# Patient Record
Sex: FEMALE | Race: WHITE | NOT HISPANIC OR LATINO | Employment: FULL TIME | URBAN - METROPOLITAN AREA
[De-identification: names, ages, dates, MRNs, and addresses within clinical notes are randomized per-mention and may not be internally consistent; named-entity substitution may affect disease eponyms.]

---

## 2017-04-12 ENCOUNTER — TRANSCRIBE ORDERS (OUTPATIENT)
Dept: OBSTETRICS AND GYNECOLOGY | Facility: CLINIC | Age: 47
End: 2017-04-12

## 2017-04-12 DIAGNOSIS — Z12.31 VISIT FOR SCREENING MAMMOGRAM: Primary | ICD-10-CM

## 2017-05-08 ENCOUNTER — HOSPITAL ENCOUNTER (OUTPATIENT)
Dept: MAMMOGRAPHY | Facility: HOSPITAL | Age: 47
Discharge: HOME | End: 2017-05-08
Attending: OBSTETRICS & GYNECOLOGY | Admitting: OBSTETRICS & GYNECOLOGY

## 2017-05-08 DIAGNOSIS — Z12.31 VISIT FOR SCREENING MAMMOGRAM: ICD-10-CM

## 2017-05-08 PROCEDURE — 77063 BREAST TOMOSYNTHESIS BI: CPT

## 2017-05-08 PROCEDURE — G0202 SCR MAMMO BI INCL CAD: HCPCS

## 2017-05-08 PROCEDURE — 77067 SCR MAMMO BI INCL CAD: CPT | Performed by: RADIOLOGY

## 2017-05-08 PROCEDURE — 77063 BREAST TOMOSYNTHESIS BI: CPT | Performed by: RADIOLOGY

## 2017-06-01 ENCOUNTER — OFFICE VISIT (OUTPATIENT)
Dept: OBSTETRICS AND GYNECOLOGY | Facility: CLINIC | Age: 47
End: 2017-06-01

## 2017-06-01 VITALS
WEIGHT: 175 LBS | BODY MASS INDEX: 25.05 KG/M2 | HEIGHT: 70 IN | DIASTOLIC BLOOD PRESSURE: 70 MMHG | SYSTOLIC BLOOD PRESSURE: 110 MMHG

## 2017-06-01 DIAGNOSIS — Z30.431 IUD CHECK UP: ICD-10-CM

## 2017-06-01 DIAGNOSIS — Z01.419 WOMEN'S ANNUAL ROUTINE GYNECOLOGICAL EXAMINATION: Primary | ICD-10-CM

## 2017-06-01 PROBLEM — Z90.49 S/P CHOLECYSTECTOMY: Status: ACTIVE | Noted: 2017-06-01

## 2017-06-01 PROBLEM — F17.210 SMOKES 1 TO 10 CIGARETTES PER DAY: Status: ACTIVE | Noted: 2017-06-01

## 2017-06-01 PROBLEM — F41.9 ANXIETY AND DEPRESSION: Status: ACTIVE | Noted: 2017-06-01

## 2017-06-01 PROBLEM — F32.A ANXIETY AND DEPRESSION: Status: ACTIVE | Noted: 2017-06-01

## 2017-06-01 PROBLEM — Z98.82 STATUS POST BREAST AUGMENTATION: Status: ACTIVE | Noted: 2017-06-01

## 2017-06-01 PROBLEM — Z97.5 IUD (INTRAUTERINE DEVICE) IN PLACE: Status: ACTIVE | Noted: 2017-06-01

## 2017-06-01 PROCEDURE — 99396 PREV VISIT EST AGE 40-64: CPT | Performed by: OBSTETRICS & GYNECOLOGY

## 2017-06-01 RX ORDER — CETIRIZINE HYDROCHLORIDE 10 MG/1
10 TABLET ORAL DAILY
COMMUNITY

## 2017-06-01 RX ORDER — MULTIPLE VITAMINS W/ MINERALS TAB 9MG-400MCG
1 TAB ORAL DAILY
COMMUNITY

## 2017-06-01 NOTE — PROGRESS NOTES
"Subjective   Chief Complaint   Patient presents with   • Annual Exam     Lita Pisano is a 46 y.o. year old  presenting to be seen for her annual exam.    Current birth control method: IUD - Mirena.She is doing well on Fetzima regarding her anxiety greater than depression.  Unsure what her Mirena IUD was placed.  This is her second.  I don't have her paper chart.  She did have a normal Pap co-test in 2015.    No LMP recorded. Patient has had an implant.    She is sexually active.   Condoms are not typically used.  No pain    Past 6 month menstrual history:    Cycle Frequency: regular, predictable and consistent every 28 - 32 days   Menstrual cycle character: flow is typically light   Cycle Duration: 0 - 2   Number of heavy days of flows: 0   Intermenstrual bleeding present: {no   Post-coital bleeding present: no     She exercises regularly: no.  Calcium intake: yes.  She performs self breast exam:no.  She has concerns about domestic violence: no.    The following portions of the patient's history were reviewed and updated as appropriate:problem list, current medications, allergies, past family history, past medical history, past social history and past surgical history.    Review of Systems    normal bladder but  bowels alternate between diarrhea and constipation bowels moved very to 3 days or the pinna what she eats she may have more diarrhea and cramping.  Objective   /70  Ht 69.75\" (177.2 cm)  Wt 175 lb (79.4 kg)  LMP Comment: mirena  BMI 25.29 kg/m2     General:  well developed; well nourished  no acute distress   Skin:  No suspicious lesions seen   Thyroid: normal to inspection and palpation   Breasts:  Examined in supine position  Symmetric without masses or skin dimpling  Nipples normal without inversion, lesions or discharge  There are no palpable axillary nodes  Bilateral implants are noted without obvious palpable abnormalities   Abdomen: soft, non-tender; no masses  no umbilical " or inginual hernias are present  no hepato-splenomegaly   Pelvis: Clinical staff was present for exam  External genitalia:  normal appearance of the external genitalia including Bartholin's and Wynot's glands.  :  urethral meatus normal; urethral hypermobility is absent.  Uterus:  normal size, shape and consistency.  Adnexa:  normal bimanual exam of the adnexa.  Rectal:  digital rectal exam not performed; anus visually normal appearing. hard stool present     Lab Review   No data reviewed    Imaging  Mammogram report       Assessment   1. Normal GYN examination  2. Mammogram up-to-date  3. Normal Pap co-test April 22, 2015  4. Mirena IUD in place when the defined old chart to get insertion date  5. Anxiety greater than depression doing well on medication     Plan   1. Encouraged continued dietary calcium and exercise  2. We'll like her to quit smoking cigarettes  3. May need to change IUD next year.  She will need a Pap co-test in 2018.  4. MiraLAX 3 times weekly    Medications Rx this encounter:  New Medications Ordered This Visit   Medications   • Multiple Vitamins-Minerals (MULTIVITAMIN WITH MINERALS) tablet tablet     Sig: Take 1 tablet by mouth Daily.   • cetirizine (zyrTEC) 10 MG tablet     Sig: Take 10 mg by mouth Daily.   • Levomilnacipran HCl ER (FETZIMA) 40 MG capsule sustained-release 24 hr     Sig: Take 1 capsule by mouth Daily.     Dispense:  90 capsule     Refill:  3          This note was electronically signed.    Kadeem Reece MD  6/1/2017

## 2018-04-30 ENCOUNTER — TRANSCRIBE ORDERS (OUTPATIENT)
Dept: ADMINISTRATIVE | Facility: HOSPITAL | Age: 48
End: 2018-04-30

## 2018-04-30 DIAGNOSIS — Z12.31 VISIT FOR SCREENING MAMMOGRAM: Primary | ICD-10-CM

## 2018-05-17 ENCOUNTER — TELEPHONE (OUTPATIENT)
Dept: OBSTETRICS AND GYNECOLOGY | Facility: CLINIC | Age: 48
End: 2018-05-17

## 2018-05-17 ENCOUNTER — OFFICE VISIT (OUTPATIENT)
Dept: OBSTETRICS AND GYNECOLOGY | Facility: CLINIC | Age: 48
End: 2018-05-17

## 2018-05-17 VITALS
SYSTOLIC BLOOD PRESSURE: 124 MMHG | RESPIRATION RATE: 16 BRPM | WEIGHT: 175 LBS | BODY MASS INDEX: 25.29 KG/M2 | DIASTOLIC BLOOD PRESSURE: 80 MMHG

## 2018-05-17 DIAGNOSIS — Z30.433 ENCOUNTER FOR REMOVAL AND REINSERTION OF INTRAUTERINE CONTRACEPTIVE DEVICE (IUD): Primary | ICD-10-CM

## 2018-05-17 PROCEDURE — 58300 INSERT INTRAUTERINE DEVICE: CPT | Performed by: OBSTETRICS & GYNECOLOGY

## 2018-05-17 PROCEDURE — 58301 REMOVE INTRAUTERINE DEVICE: CPT | Performed by: OBSTETRICS & GYNECOLOGY

## 2018-05-17 RX ORDER — NEBIVOLOL HYDROCHLORIDE 10 MG/1
TABLET ORAL
COMMUNITY
Start: 2018-05-03 | End: 2021-01-19

## 2018-05-17 NOTE — TELEPHONE ENCOUNTER
[facility] pt-[facility] in [place] called with questions regarding a RX that was sent in. Please contact back

## 2018-05-17 NOTE — PROGRESS NOTES
PROCEDURE NOTES    I identified the patient and obtained her informed consent for the procedure.    PROCEDURE  REMOVAL OF IUD    We discussed options for contraception and Lita Pisano choose to have her IUD removed and reinserted and she was doing well with only minimal flow for.  Speculum was placed and cervix identified.  The IUD string was located.  With a Ring Forcep the Mirena  IUD  was removed, she tolerated it well.   The procedure was completed without any complications.    PROCEDURE    IUD INSERTION    This patient was counseled on the benefits and risks of insertion.  Her uterus was retroflexed on bimanual examination  She has chosen  the Mirena.    No LMP recorded. Patient has had an implant.   UCG  not done    After re-gloving with sterile gloves, the uterus sounded to 8 cm   The The Mirena was advanced to a point 2 cms from the fundus and then the arms were released from the sheath.  The device was advanced to the fundus and the device was released fully from the sheath.  .  An Allis clamp was required due to the retroflexion of her uterus.  The string was trimmed to 3-3.5 cm.    The procedure was well tolerated. Lita Pisano was given 's pamphlet and instructions on pelvic rest for 72 hours; OTC tylenol/ NSAIDS and heating pad prn.   She will call for fever, pain , abnormal discharge or heavy bleeding.   Follow up in 6 weeks or sooner as needed.    Kadeem Reece MD        The above

## 2018-05-21 ENCOUNTER — HOSPITAL ENCOUNTER (OUTPATIENT)
Dept: MAMMOGRAPHY | Facility: HOSPITAL | Age: 48
Discharge: HOME | End: 2018-05-21
Attending: OBSTETRICS & GYNECOLOGY | Admitting: OBSTETRICS & GYNECOLOGY

## 2018-05-21 DIAGNOSIS — Z12.31 VISIT FOR SCREENING MAMMOGRAM: ICD-10-CM

## 2018-05-21 PROCEDURE — 77067 SCR MAMMO BI INCL CAD: CPT | Performed by: RADIOLOGY

## 2018-05-21 PROCEDURE — 77067 SCR MAMMO BI INCL CAD: CPT

## 2018-05-21 PROCEDURE — 77063 BREAST TOMOSYNTHESIS BI: CPT | Performed by: RADIOLOGY

## 2018-05-21 PROCEDURE — 77063 BREAST TOMOSYNTHESIS BI: CPT

## 2018-06-28 ENCOUNTER — OFFICE VISIT (OUTPATIENT)
Dept: OBSTETRICS AND GYNECOLOGY | Facility: CLINIC | Age: 48
End: 2018-06-28

## 2018-06-28 VITALS
WEIGHT: 178 LBS | BODY MASS INDEX: 25.48 KG/M2 | HEIGHT: 70 IN | SYSTOLIC BLOOD PRESSURE: 112 MMHG | DIASTOLIC BLOOD PRESSURE: 60 MMHG

## 2018-06-28 DIAGNOSIS — Z01.419 ENCOUNTER FOR WELL WOMAN EXAM WITH ROUTINE GYNECOLOGICAL EXAM: Primary | ICD-10-CM

## 2018-06-28 DIAGNOSIS — Z30.431 IUD CHECK UP: ICD-10-CM

## 2018-06-28 DIAGNOSIS — F41.9 ANXIETY AND DEPRESSION: ICD-10-CM

## 2018-06-28 DIAGNOSIS — F32.A ANXIETY AND DEPRESSION: ICD-10-CM

## 2018-06-28 PROCEDURE — 99396 PREV VISIT EST AGE 40-64: CPT | Performed by: OBSTETRICS & GYNECOLOGY

## 2018-06-28 NOTE — PROGRESS NOTES
"Subjective   Chief Complaint   Patient presents with   • Annual Exam     6 wks mirena f/u     Lita Pisano is a 47 y.o. year old  presenting to be seen for her annual exam.    Current birth control method: IUD - Mirena.  Recently placed.  She's not had any bleeding at all.  She is not complaining that that at all.  Recent mammogram was normal    No LMP recorded. Patient has had an implant.    She is sexually active.   Condoms are not typically used.      Past 6 month menstrual history:    Cycle Frequency: absent                         She exercises regularly: no.  Calcium intake: yes.  She performs self breast exam:no.  She has concerns about domestic violence: no.    The following portions of the patient's history were reviewed and updated as appropriate:problem list, current medications, allergies, past family history, past medical history, past social history and past surgical history.    Review of Systems    normal bladder and bowels  Objective   /60   Ht 176.5 cm (69.5\")   Wt 80.7 kg (178 lb)   BMI 25.91 kg/m²      General:  well developed; well nourished  no acute distress  appears stated age   Skin:  No suspicious lesions seen   Thyroid:    Breasts:  Examined in supine position  Symmetric without masses or skin dimpling  Nipples normal without inversion, lesions or discharge  There are no palpable axillary nodes  Patient is lactating.  No areas of erythema or tenderness noted and the nipples are normal   Abdomen: soft, non-tender; no masses  no umbilical or inginual hernias are present  no hepato-splenomegaly   Pelvis: Clinical staff was present for exam  External genitalia:  normal appearance of the external genitalia including Bartholin's and [place]'s glands.  :  urethral meatus normal; urethral hypermobility is absent.  Vaginal:  normal pink mucosa without prolapse or lesions.  Cervix:  normal appearance. IUD string present - 2.5 cms in length; pap  Uterus:  normal size, shape and " consistency. anteverted;  Adnexa:  normal bimanual exam of the adnexa.  Rectal:  digital rectal exam not performed; anus visually normal appearing.     Lab Review   2014 and 15 lipids TSH its 5 M.D. all normal    Imaging  Mammogram report       Assessment   1. Normal GYN examination  2. Amenorrhea secondary to IUD  3. Normal lab work 2014 and 15 probably about a repeat next year  4. Anxiety depression doing well on Fetzima  5. 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 1000 mg calcium in divided doses ideally in diet; regular exercise  2. Self breast awareness mammograms discussed  3. Labs at next year's appointment  4. Discontinue smoking if possible      Medications Rx this encounter:  New Medications Ordered This Visit   Medications   • Levomilnacipran HCl ER (FETZIMA) 40 MG capsule sustained-release 24 hr     Sig: Take 40 mg by mouth Daily.     Dispense:  90 capsule     Refill:  3          This note was electronically signed.    Kadeem Reece MD  6/28/2018

## 2019-05-02 ENCOUNTER — TRANSCRIBE ORDERS (OUTPATIENT)
Dept: OBSTETRICS AND GYNECOLOGY | Facility: CLINIC | Age: 49
End: 2019-05-02

## 2019-05-02 DIAGNOSIS — Z12.31 VISIT FOR SCREENING MAMMOGRAM: Primary | ICD-10-CM

## 2019-06-21 ENCOUNTER — HOSPITAL ENCOUNTER (OUTPATIENT)
Dept: MAMMOGRAPHY | Facility: HOSPITAL | Age: 49
Discharge: HOME | End: 2019-06-21
Admitting: OBSTETRICS & GYNECOLOGY

## 2019-06-21 DIAGNOSIS — Z12.31 VISIT FOR SCREENING MAMMOGRAM: ICD-10-CM

## 2019-06-21 PROCEDURE — 77067 SCR MAMMO BI INCL CAD: CPT

## 2019-06-21 PROCEDURE — 77067 SCR MAMMO BI INCL CAD: CPT | Performed by: RADIOLOGY

## 2019-06-21 PROCEDURE — 77063 BREAST TOMOSYNTHESIS BI: CPT | Performed by: RADIOLOGY

## 2019-06-21 PROCEDURE — 77063 BREAST TOMOSYNTHESIS BI: CPT

## 2019-09-03 ENCOUNTER — TRANSCRIBE ORDERS (OUTPATIENT)
Dept: LAB | Facility: HOSPITAL | Age: 49
End: 2019-09-03

## 2019-09-03 ENCOUNTER — LAB (OUTPATIENT)
Dept: LAB | Facility: HOSPITAL | Age: 49
End: 2019-09-03

## 2019-09-03 DIAGNOSIS — Z01.818 PREOP TESTING: Primary | ICD-10-CM

## 2019-09-03 DIAGNOSIS — Z01.818 PREOP TESTING: ICD-10-CM

## 2019-09-03 LAB
ALBUMIN SERPL-MCNC: 4.9 G/DL (ref 3.5–5.2)
ALBUMIN/GLOB SERPL: 2 G/DL
ALP SERPL-CCNC: 50 U/L (ref 39–117)
ALT SERPL W P-5'-P-CCNC: 14 U/L (ref 1–33)
ANION GAP SERPL CALCULATED.3IONS-SCNC: 12.5 MMOL/L (ref 5–15)
AST SERPL-CCNC: 16 U/L (ref 1–32)
BASOPHILS # BLD AUTO: 0.03 10*3/MM3 (ref 0–0.2)
BASOPHILS NFR BLD AUTO: 0.3 % (ref 0–1.5)
BILIRUB SERPL-MCNC: 0.3 MG/DL (ref 0.2–1.2)
BUN BLD-MCNC: 11 MG/DL (ref 6–20)
BUN/CREAT SERPL: 14.3 (ref 7–25)
CALCIUM SPEC-SCNC: 9.7 MG/DL (ref 8.6–10.5)
CHLORIDE SERPL-SCNC: 100 MMOL/L (ref 98–107)
CO2 SERPL-SCNC: 26.5 MMOL/L (ref 22–29)
CREAT BLD-MCNC: 0.77 MG/DL (ref 0.57–1)
DEPRECATED RDW RBC AUTO: 42.5 FL (ref 37–54)
EOSINOPHIL # BLD AUTO: 0.22 10*3/MM3 (ref 0–0.4)
EOSINOPHIL NFR BLD AUTO: 2.4 % (ref 0.3–6.2)
ERYTHROCYTE [DISTWIDTH] IN BLOOD BY AUTOMATED COUNT: 12.3 % (ref 12.3–15.4)
GFR SERPL CREATININE-BSD FRML MDRD: 80 ML/MIN/1.73
GLOBULIN UR ELPH-MCNC: 2.4 GM/DL
GLUCOSE BLD-MCNC: 89 MG/DL (ref 65–99)
HCT VFR BLD AUTO: 44 % (ref 34–46.6)
HGB BLD-MCNC: 14.5 G/DL (ref 12–15.9)
IMM GRANULOCYTES # BLD AUTO: 0.02 10*3/MM3 (ref 0–0.05)
IMM GRANULOCYTES NFR BLD AUTO: 0.2 % (ref 0–0.5)
LYMPHOCYTES # BLD AUTO: 3 10*3/MM3 (ref 0.7–3.1)
LYMPHOCYTES NFR BLD AUTO: 32.7 % (ref 19.6–45.3)
MCH RBC QN AUTO: 30.9 PG (ref 26.6–33)
MCHC RBC AUTO-ENTMCNC: 33 G/DL (ref 31.5–35.7)
MCV RBC AUTO: 93.6 FL (ref 79–97)
MONOCYTES # BLD AUTO: 0.65 10*3/MM3 (ref 0.1–0.9)
MONOCYTES NFR BLD AUTO: 7.1 % (ref 5–12)
NEUTROPHILS # BLD AUTO: 5.26 10*3/MM3 (ref 1.7–7)
NEUTROPHILS NFR BLD AUTO: 57.3 % (ref 42.7–76)
NRBC BLD AUTO-RTO: 0 /100 WBC (ref 0–0.2)
PLATELET # BLD AUTO: 265 10*3/MM3 (ref 140–450)
PMV BLD AUTO: 11 FL (ref 6–12)
POTASSIUM BLD-SCNC: 4.4 MMOL/L (ref 3.5–5.2)
PROT SERPL-MCNC: 7.3 G/DL (ref 6–8.5)
RBC # BLD AUTO: 4.7 10*6/MM3 (ref 3.77–5.28)
SODIUM BLD-SCNC: 139 MMOL/L (ref 136–145)
WBC NRBC COR # BLD: 9.18 10*3/MM3 (ref 3.4–10.8)

## 2019-09-03 PROCEDURE — 80053 COMPREHEN METABOLIC PANEL: CPT

## 2019-09-03 PROCEDURE — 36415 COLL VENOUS BLD VENIPUNCTURE: CPT

## 2019-09-03 PROCEDURE — 85025 COMPLETE CBC W/AUTO DIFF WBC: CPT

## 2020-05-14 ENCOUNTER — LAB (OUTPATIENT)
Dept: LAB | Facility: HOSPITAL | Age: 50
End: 2020-05-14

## 2020-05-14 ENCOUNTER — TRANSCRIBE ORDERS (OUTPATIENT)
Dept: LAB | Facility: HOSPITAL | Age: 50
End: 2020-05-14

## 2020-05-14 DIAGNOSIS — N95.1 SYMPTOMATIC MENOPAUSAL OR FEMALE CLIMACTERIC STATES: ICD-10-CM

## 2020-05-14 DIAGNOSIS — I10 ESSENTIAL HYPERTENSION, BENIGN: ICD-10-CM

## 2020-05-14 DIAGNOSIS — R73.09 IMPAIRED GLUCOSE TOLERANCE TEST: Primary | ICD-10-CM

## 2020-05-14 DIAGNOSIS — R73.09 IMPAIRED GLUCOSE TOLERANCE TEST: ICD-10-CM

## 2020-05-14 LAB
ALBUMIN SERPL-MCNC: 4.9 G/DL (ref 3.5–5.2)
ALBUMIN/GLOB SERPL: 1.7 G/DL
ALP SERPL-CCNC: 53 U/L (ref 39–117)
ALT SERPL W P-5'-P-CCNC: 25 U/L (ref 1–33)
ANION GAP SERPL CALCULATED.3IONS-SCNC: 10.5 MMOL/L (ref 5–15)
AST SERPL-CCNC: 24 U/L (ref 1–32)
BILIRUB SERPL-MCNC: 0.4 MG/DL (ref 0.2–1.2)
BUN BLD-MCNC: 18 MG/DL (ref 6–20)
BUN/CREAT SERPL: 24.7 (ref 7–25)
CALCIUM SPEC-SCNC: 9.6 MG/DL (ref 8.6–10.5)
CHLORIDE SERPL-SCNC: 99 MMOL/L (ref 98–107)
CHOLEST SERPL-MCNC: 216 MG/DL (ref 0–200)
CO2 SERPL-SCNC: 28.5 MMOL/L (ref 22–29)
CREAT BLD-MCNC: 0.73 MG/DL (ref 0.57–1)
ESTRADIOL SERPL HS-MCNC: 40.4 PG/ML
FSH SERPL-ACNC: 31.5 MIU/ML
GFR SERPL CREATININE-BSD FRML MDRD: 85 ML/MIN/1.73
GLOBULIN UR ELPH-MCNC: 2.9 GM/DL
GLUCOSE BLD-MCNC: 101 MG/DL (ref 65–99)
HBA1C MFR BLD: 5.53 % (ref 4.8–5.6)
HDLC SERPL-MCNC: 43 MG/DL (ref 40–60)
LDLC SERPL CALC-MCNC: 140 MG/DL (ref 0–100)
LDLC/HDLC SERPL: 3.26 {RATIO}
LH SERPL-ACNC: 18.8 MIU/ML
POTASSIUM BLD-SCNC: 5 MMOL/L (ref 3.5–5.2)
PROT SERPL-MCNC: 7.8 G/DL (ref 6–8.5)
SODIUM BLD-SCNC: 138 MMOL/L (ref 136–145)
TRIGL SERPL-MCNC: 165 MG/DL (ref 0–150)
TSH SERPL DL<=0.05 MIU/L-ACNC: 1.17 UIU/ML (ref 0.27–4.2)
VLDLC SERPL-MCNC: 33 MG/DL (ref 5–40)

## 2020-05-14 PROCEDURE — 82670 ASSAY OF TOTAL ESTRADIOL: CPT

## 2020-05-14 PROCEDURE — 80061 LIPID PANEL: CPT

## 2020-05-14 PROCEDURE — 36415 COLL VENOUS BLD VENIPUNCTURE: CPT

## 2020-05-14 PROCEDURE — 83001 ASSAY OF GONADOTROPIN (FSH): CPT

## 2020-05-14 PROCEDURE — 83036 HEMOGLOBIN GLYCOSYLATED A1C: CPT

## 2020-05-14 PROCEDURE — 80053 COMPREHEN METABOLIC PANEL: CPT

## 2020-05-14 PROCEDURE — 84443 ASSAY THYROID STIM HORMONE: CPT

## 2020-10-20 ENCOUNTER — HOSPITAL ENCOUNTER (OUTPATIENT)
Dept: MAMMOGRAPHY | Facility: HOSPITAL | Age: 50
Discharge: HOME | End: 2020-10-20
Admitting: OBSTETRICS & GYNECOLOGY

## 2020-10-20 DIAGNOSIS — Z12.31 VISIT FOR SCREENING MAMMOGRAM: ICD-10-CM

## 2020-10-20 PROCEDURE — 77067 SCR MAMMO BI INCL CAD: CPT | Performed by: RADIOLOGY

## 2020-10-20 PROCEDURE — 77067 SCR MAMMO BI INCL CAD: CPT

## 2020-10-20 PROCEDURE — 77063 BREAST TOMOSYNTHESIS BI: CPT

## 2020-10-20 PROCEDURE — 77063 BREAST TOMOSYNTHESIS BI: CPT | Performed by: RADIOLOGY

## 2020-10-27 NOTE — PROGRESS NOTES
She works at [employer].  Last seen June 2018.  Needs annual appointment.  Thank you

## 2020-12-23 ENCOUNTER — APPOINTMENT (OUTPATIENT)
Dept: MAMMOGRAPHY | Facility: HOSPITAL | Age: 50
End: 2020-12-23

## 2021-01-14 ENCOUNTER — IMMUNIZATION (OUTPATIENT)
Dept: VACCINE CLINIC | Facility: HOSPITAL | Age: 51
End: 2021-01-14

## 2021-01-14 PROCEDURE — 0001A: CPT

## 2021-01-14 PROCEDURE — 91300 HC SARSCOV02 VAC 30MCG/0.3ML IM: CPT

## 2021-01-19 ENCOUNTER — OFFICE VISIT (OUTPATIENT)
Dept: OBSTETRICS AND GYNECOLOGY | Facility: CLINIC | Age: 51
End: 2021-01-19

## 2021-01-19 VITALS
WEIGHT: 186 LBS | SYSTOLIC BLOOD PRESSURE: 118 MMHG | DIASTOLIC BLOOD PRESSURE: 70 MMHG | HEIGHT: 70 IN | BODY MASS INDEX: 26.63 KG/M2

## 2021-01-19 DIAGNOSIS — Z01.419 ENCOUNTER FOR WELL WOMAN EXAM WITH ROUTINE GYNECOLOGICAL EXAM: Primary | ICD-10-CM

## 2021-01-19 DIAGNOSIS — N39.3 SUI (STRESS URINARY INCONTINENCE, FEMALE): ICD-10-CM

## 2021-01-19 DIAGNOSIS — N95.1 MENOPAUSAL SYMPTOMS: ICD-10-CM

## 2021-01-19 DIAGNOSIS — Z12.11 SCREENING FOR COLON CANCER: ICD-10-CM

## 2021-01-19 PROBLEM — Z90.49 S/P CHOLECYSTECTOMY: Status: RESOLVED | Noted: 2017-06-01 | Resolved: 2021-01-19

## 2021-01-19 PROCEDURE — 99396 PREV VISIT EST AGE 40-64: CPT | Performed by: OBSTETRICS & GYNECOLOGY

## 2021-01-19 RX ORDER — NEBIVOLOL HYDROCHLORIDE 20 MG/1
10 TABLET ORAL EVERY MORNING
COMMUNITY
Start: 2020-10-29

## 2021-01-19 RX ORDER — HYDROCHLOROTHIAZIDE 12.5 MG/1
12.5 CAPSULE, GELATIN COATED ORAL EVERY OTHER DAY
COMMUNITY

## 2021-01-19 RX ORDER — ATORVASTATIN CALCIUM 10 MG/1
10 TABLET, FILM COATED ORAL DAILY
COMMUNITY
Start: 2020-11-19

## 2021-01-19 NOTE — PROGRESS NOTES
Subjective   Chief Complaint   Patient presents with   • Annual Exam   • Menopause     Lita Pisano is a 50 y.o. year old  presenting to be seen for her annual exam.    Current birth control method: IUD - Mirena.  Reviewed her lab work from May 2020 normal TSH FSH LH estradiol etc. she is still having some hot flashes during the day.  Does not sleep well might wake up feeling a little warm but hot flashes are worse and night sweats.  Discussed options probably could just give her some estrogen as a trial to see if her symptoms improve.  We do have samples of Premarin 0.3 mg to take daily and see if her symptoms do not resolve.    No LMP recorded. Patient has had an implant.    She is sexually active.   Condoms are not typically used.    STDs and sexual behavior discussed.  Annetta is painful or she is having problems :no  She has concerns about domestic violence: no.    Cycle Frequency: absent                         She exercises regularly: no.  Discussed maintaining healthy weight and nutrition and injury avoidance  Self breast awareness:no    Calcium intake: is not adequate.2  Caffeine intake: caffeine use is moderate-to-high daily  Social History    Tobacco Use      Smoking status: Current Some Day Smoker        Packs/day: 0.50      Smokeless tobacco: Current User    Social History     Substance and Sexual Activity   Alcohol Use Yes   • Frequency: Monthly or less      Discussed avoidance of illicit drugs    The following portions of the patient's history were reviewed and updated as is  appropriate:problem list, current medications, allergies, past family history, past medical history, past social history and past surgical history.    Current Outpatient Medications:   •  atorvastatin (LIPITOR) 10 MG tablet, Take 10 mg by mouth Daily., Disp: , Rfl:   •  Bystolic 20 MG tablet, Take 20 mg by mouth Daily., Disp: , Rfl:   •  cetirizine (zyrTEC) 10 MG tablet, Take 10 mg by mouth Daily., Disp: , Rfl:   •   "hydroCHLOROthiazide (MICROZIDE) 12.5 MG capsule, Take 12.5 mg by mouth Daily., Disp: , Rfl:   •  Levomilnacipran HCl ER (FETZIMA) 40 MG capsule sustained-release 24 hr, Take 40 mg by mouth Daily., Disp: 90 capsule, Rfl: 3  •  levonorgestrel (MIRENA, 52 MG,) 20 MCG/24HR IUD, 1 each by Intrauterine route 1 (One) Time., Disp: , Rfl:   •  Multiple Vitamins-Minerals (MULTIVITAMIN WITH MINERALS) tablet tablet, Take 1 tablet by mouth Daily., Disp: , Rfl:   •  estrogens, conjugated, (Premarin) 0.3 MG tablet, Take 1 tablet by mouth Daily., Disp: 30 tablet, Rfl: 11    Discussed risk factors for hypertension, hyperlipidemia coronary heart disease.    Review of systems    Constitutional    POS weight gain                            NEG anorexia, fatigue, fevers or night sweats  Breast                POS nothing reported                            NEG persistent breast lump, skin dimpling or nipple discharge  GI                      POS nothing reported                            NEG bloating, change in bowel habits, melena or reflux symptoms                       POS EVANS is present but it IS NOT effecting her ADL's                            NEG dysuria, frequency or hematuria         Objective   /70   Ht 177.2 cm (69.75\")   Wt 84.4 kg (186 lb)   BMI 26.88 kg/m²       General:  well developed; well nourished  no acute distress  appears stated age   Skin:  No suspicious lesions seen   Thyroid:    Breasts:  Examined in supine position  Symmetric without masses or skin dimpling  Nipples normal without inversion, lesions or discharge  Bilateral implants are noted without obvious palpable abnormalities   Abdomen: soft, non-tender; no masses  no umbilical or inguinal hernias are present  no hepato-splenomegaly   Pelvis: Clinical staff was present for exam  External genitalia:  normal appearance of the external genitalia including Bartholin's and [place]'s glands.  :  urethral meatus normal;  Vaginal:  normal pink mucosa " without prolapse or lesions. she is able to perform a Kegel contraction upon request;  Cervix:  normal appearance. friable; Pap obtained  Uterus:  normal size, shape and consistency.  Adnexa:  normal bimanual exam of the adnexa.  Rectal:  digital rectal exam not performed; anus visually normal appearing.       Lab Review   FSH, LIPIDS, Pap test and TSH    Imaging  Mammogram report               Assessment     1. Perimenopausal examination with symptomatic hot flashes.  Will give trial of Premarin 0.3 mg daily #10  2. Rare stress incontinence with fairly weak Kegel response.  Not interfering with activities of daily living not wearing a pad etc. but would encourage Kegel exercises with cough laugh and sneezing and regular voiding.  3. She is up-to-date on mammogram and now Pap testing discussed colonoscopies  4. Family history osteoporosis in her mother will give calcium diet information.             Plan     1. Annual examination or sooner as needed  2. Trial of Premarin 0.3 mg daily #10  3. 1000 mg calcium in divided doses ideally in diet; regular exercise  4. Self breast awareness discussed  4.    Timed voiding Kegel's as above            New Medications Ordered This Visit   Medications   • estrogens, conjugated, (Premarin) 0.3 MG tablet     Sig: Take 1 tablet by mouth Daily.     Dispense:  30 tablet     Refill:  11     Orders Placed This Encounter   Procedures   • Ambulatory Referral For Screening Colonoscopy     Referral Priority:   Routine     Referral Type:   Diagnostic Medical     Referral Reason:   Specialty Services Required     Referred to Provider:   Peña Dumont MD     Number of Visits Requested:   1           This note was electronically signed.    Kadeem Reece MD  1/19/2021

## 2021-02-04 ENCOUNTER — IMMUNIZATION (OUTPATIENT)
Dept: VACCINE CLINIC | Facility: HOSPITAL | Age: 51
End: 2021-02-04

## 2021-02-04 PROCEDURE — 0002A: CPT | Performed by: INTERNAL MEDICINE

## 2021-02-04 PROCEDURE — 91300 HC SARSCOV02 VAC 30MCG/0.3ML IM: CPT | Performed by: INTERNAL MEDICINE

## 2022-03-23 ENCOUNTER — TRANSCRIBE ORDERS (OUTPATIENT)
Dept: ADMINISTRATIVE | Facility: HOSPITAL | Age: 52
End: 2022-03-23

## 2022-03-23 DIAGNOSIS — Z12.31 VISIT FOR SCREENING MAMMOGRAM: Primary | ICD-10-CM

## 2022-05-02 ENCOUNTER — OFFICE VISIT (OUTPATIENT)
Dept: OBSTETRICS AND GYNECOLOGY | Facility: CLINIC | Age: 52
End: 2022-05-02

## 2022-05-02 VITALS
BODY MASS INDEX: 25.05 KG/M2 | DIASTOLIC BLOOD PRESSURE: 80 MMHG | HEIGHT: 70 IN | WEIGHT: 175 LBS | SYSTOLIC BLOOD PRESSURE: 118 MMHG

## 2022-05-02 DIAGNOSIS — Z01.419 ROUTINE GYNECOLOGICAL EXAMINATION: Primary | ICD-10-CM

## 2022-05-02 DIAGNOSIS — Z01.419 PAP TEST, AS PART OF ROUTINE GYNECOLOGICAL EXAMINATION: ICD-10-CM

## 2022-05-02 PROCEDURE — 99396 PREV VISIT EST AGE 40-64: CPT | Performed by: NURSE PRACTITIONER

## 2022-05-02 RX ORDER — ESTRADIOL 0.03 MG/D
1 FILM, EXTENDED RELEASE TRANSDERMAL 2 TIMES WEEKLY
COMMUNITY
End: 2022-06-29 | Stop reason: HOSPADM

## 2022-05-02 RX ORDER — MONTELUKAST SODIUM 10 MG/1
10 TABLET ORAL NIGHTLY
COMMUNITY

## 2022-05-02 NOTE — PROGRESS NOTES
GYN Annual Exam     CC - Here for annual exam.        HPI  Lita Pisano is a 51 y.o. female, , who presents for annual well woman exam.  She is premenopausal.  Periods are absent .  Dysmenorrhea:mild, occurring premenstrually..  Patient reports problems with: none.  Partner Status: Marital Status: .   She has had Mirena IUD since 2018 (). She states her PCP prescribes premarin 0.3mg she uses daily and then IUD for progesterone.     Additional OB/GYN History   Current contraception: Mirena IUD insertion 2018  Desires to: continue contraception  On HRT? Yes. Details: estradiol  Last Pap : 21  Last Completed Pap Smear          Ordered - PAP SMEAR (Every 3 Years) Ordered on 2021  SCANNED - PAP SMEAR    2018  Pap IG, HPV-hr    2015  SCANNED - PAP SMEAR              History of abnormal Pap smear: yes - ASCUS HPV Neg  Family history of uterine, colon, breast, or ovarian cancer: no  Performs monthly Self-Breast Exam: no  Last mammogram: 10/20/20. Done at .    Last Completed Mammogram          Scheduled - MAMMOGRAM (Every 2 Years) Scheduled for 2022    10/20/2020  Mammo Screening Digital Tomosynthesis Bilateral With CAD    2019  Mammo Screening Digital Tomosynthesis Bilateral With CAD    2018  Mammo Screening Digital Tomosynthesis Bilateral With CAD    2017  Mammo Screening Digital Tomosynthesis Bilateral With CAD    2016  Mammo screening bilateral w CAD    Only the first 5 history entries have been loaded, but more history exists.              Last colonoscopy: Never  Last Completed Colonoscopy     This patient has no relevant Health Maintenance data.        Last DEXA: Maybe 10 years ago, unsure  Exercises Regularly: no  Feelings of Anxiety or Depression: no     Pt. Smokes approximately 1/3 pack of cigarettes per day. She is not willing to quit at this time.             OB History        3    Para   3    Term   3     "        AB        Living   3       SAB        IAB        Ectopic        Molar        Multiple        Live Births                    Health Maintenance   Topic Date Due   • COLORECTAL CANCER SCREENING  Never done   • ANNUAL PHYSICAL  Never done   • Pneumococcal Vaccine 0-64 (1 - PCV) Never done   • TDAP/TD VACCINES (1 - Tdap) Never done   • HEPATITIS C SCREENING  Never done   • COVID-19 Vaccine (3 - Booster for Pfizer series) 2021   • ZOSTER VACCINE (1 of 2) Never done   • Annual Gynecologic Pelvic and Breast Exam  2022   • LIPID PANEL  2022   • INFLUENZA VACCINE  2022   • MAMMOGRAM  10/20/2022   • PAP SMEAR  2024       The additional following portions of the patient's history were reviewed and updated as appropriate: allergies, current medications, past family history, past medical history, past social history, past surgical history and problem list.    Review of Systems   Constitutional: Negative.    Cardiovascular: Negative.    Gastrointestinal: Negative.    Genitourinary: Negative.    Psychiatric/Behavioral: Negative.        I have reviewed and agree with the HPI, ROS, and historical information as entered above. Maura Jameson, APRN    Objective   /80   Ht 177.8 cm (70\")   Wt 79.4 kg (175 lb)   LMP  (LMP Unknown)   BMI 25.11 kg/m²     Physical Exam  Vitals and nursing note reviewed. Exam conducted with a chaperone present.   Constitutional:       Appearance: She is well-developed.   HENT:      Head: Normocephalic and atraumatic.   Neck:      Thyroid: No thyroid mass or thyromegaly.   Cardiovascular:      Rate and Rhythm: Normal rate and regular rhythm.      Heart sounds: No murmur heard.  Pulmonary:      Effort: Pulmonary effort is normal. No retractions.      Breath sounds: Normal breath sounds. No wheezing, rhonchi or rales.   Chest:      Chest wall: No mass or tenderness.   Breasts:      Right: Normal. No mass, nipple discharge, skin change or tenderness.      " Left: Normal. No mass, nipple discharge, skin change or tenderness.       Abdominal:      General: Bowel sounds are normal.      Palpations: Abdomen is soft. Abdomen is not rigid. There is no mass.      Tenderness: There is no abdominal tenderness. There is no guarding.      Hernia: No hernia is present.   Genitourinary:     General: Normal vulva.      Labia:         Right: No rash, tenderness or lesion.         Left: No rash, tenderness or lesion.       Vagina: Normal. No vaginal discharge or lesions.      Cervix: No cervical motion tenderness, discharge, lesion or cervical bleeding.      Uterus: Normal. Not enlarged, not fixed and not tender.       Adnexa:         Right: No mass or tenderness.          Left: No mass or tenderness.        Rectum: Normal. No external hemorrhoid.      Comments: IUD strings visualized on exam   Musculoskeletal:      Cervical back: Normal range of motion. No muscular tenderness.   Neurological:      Mental Status: She is alert and oriented to person, place, and time.   Psychiatric:         Behavior: Behavior normal.            Assessment and Plan    Problem List Items Addressed This Visit    None     Visit Diagnoses     Routine gynecological examination    -  Primary    Pap test, as part of routine gynecological examination        Relevant Orders    Pap IG, HPV-hr          1. GYN annual well woman exam.   2. Mammogram scheduled 5/24/2022  3. Has cologuard kit at home. Encouraged her to use this since she is not interested in doing screening colonoscopy at this time.   4. She has had Mirena IUD since 5/2018. She also is prescribed Premarin 0.3mg daily by PCP for hot flashes and night sweats that she has been on for approximately 1 year. Discussed the risk of stroke if on ERT and smokes.   5. Reviewed monthly self breast exams.  Instructed to call with lumps, pain, or breast discharge.  Yearly mammograms ordered.  6. Recommended use of Vitamin D and getting adequate calcium in her diet.  (1500mg)  7. Reviewed exercise as a preventative health measures.   8. Colonoscopy recommended.  9. Reccommended Flu Vaccine in Fall of each year.  10. RTC in 1 year or PRN with problems.      Maura Jameson, APRN  05/02/2022

## 2022-05-06 ENCOUNTER — PATIENT ROUNDING (BHMG ONLY) (OUTPATIENT)
Dept: OBSTETRICS AND GYNECOLOGY | Facility: CLINIC | Age: 52
End: 2022-05-06

## 2022-05-06 NOTE — PROGRESS NOTES
May 6, 2022    Hello, may I speak with Lita Pisano?    My name is Sayda      I am  with [facility]   [facility]  [address]  [facility] [address] [phone].    Before we get started may I verify your date of birth? 1970    I am calling to officially welcome you to our practice and ask about your recent visit. Is this a good time to talk? yes    Tell me about your visit with us. What things went well?  Everything went great.       We're always looking for ways to make our patients' experiences even better. Do you have recommendations on ways we may improve?  no    Overall were you satisfied with your first visit to our practice? yes       I appreciate you taking the time to speak with me today. Is there anything else I can do for you? no      Thank you, and have a great day.

## 2022-05-11 DIAGNOSIS — Z01.419 PAP TEST, AS PART OF ROUTINE GYNECOLOGICAL EXAMINATION: ICD-10-CM

## 2022-05-16 ENCOUNTER — PATIENT MESSAGE (OUTPATIENT)
Dept: OBSTETRICS AND GYNECOLOGY | Facility: CLINIC | Age: 52
End: 2022-05-16

## 2022-05-17 RX ORDER — FLUCONAZOLE 150 MG/1
TABLET ORAL
Qty: 2 TABLET | Refills: 0 | Status: SHIPPED | OUTPATIENT
Start: 2022-05-17 | End: 2022-06-29 | Stop reason: HOSPADM

## 2022-05-24 ENCOUNTER — HOSPITAL ENCOUNTER (OUTPATIENT)
Dept: MAMMOGRAPHY | Facility: HOSPITAL | Age: 52
Discharge: HOME | End: 2022-05-24
Admitting: NURSE PRACTITIONER

## 2022-05-24 DIAGNOSIS — Z12.31 VISIT FOR SCREENING MAMMOGRAM: ICD-10-CM

## 2022-05-24 PROCEDURE — 77067 SCR MAMMO BI INCL CAD: CPT

## 2022-05-24 PROCEDURE — 77063 BREAST TOMOSYNTHESIS BI: CPT

## 2022-05-24 PROCEDURE — 77063 BREAST TOMOSYNTHESIS BI: CPT | Performed by: RADIOLOGY

## 2022-05-24 PROCEDURE — 77067 SCR MAMMO BI INCL CAD: CPT | Performed by: RADIOLOGY

## 2022-06-01 ENCOUNTER — HOSPITAL ENCOUNTER (OUTPATIENT)
Dept: ULTRASOUND IMAGING | Facility: HOSPITAL | Age: 52
Discharge: HOME | End: 2022-06-01

## 2022-06-01 ENCOUNTER — HOSPITAL ENCOUNTER (OUTPATIENT)
Dept: MAMMOGRAPHY | Facility: HOSPITAL | Age: 52
Discharge: HOME | End: 2022-06-01

## 2022-06-01 ENCOUNTER — TRANSCRIBE ORDERS (OUTPATIENT)
Dept: MAMMOGRAPHY | Facility: HOSPITAL | Age: 52
End: 2022-06-01

## 2022-06-01 DIAGNOSIS — R92.8 ABNORMAL MAMMOGRAM: ICD-10-CM

## 2022-06-01 DIAGNOSIS — R92.8 ABNORMAL MAMMOGRAM: Primary | ICD-10-CM

## 2022-06-01 PROCEDURE — 77065 DX MAMMO INCL CAD UNI: CPT

## 2022-06-01 PROCEDURE — G0279 TOMOSYNTHESIS, MAMMO: HCPCS

## 2022-06-01 PROCEDURE — 77061 BREAST TOMOSYNTHESIS UNI: CPT | Performed by: RADIOLOGY

## 2022-06-01 PROCEDURE — 76642 ULTRASOUND BREAST LIMITED: CPT

## 2022-06-01 PROCEDURE — 77065 DX MAMMO INCL CAD UNI: CPT | Performed by: RADIOLOGY

## 2022-06-01 PROCEDURE — 76642 ULTRASOUND BREAST LIMITED: CPT | Performed by: RADIOLOGY

## 2022-06-02 ENCOUNTER — HOSPITAL ENCOUNTER (OUTPATIENT)
Dept: MAMMOGRAPHY | Facility: HOSPITAL | Age: 52
Discharge: HOME | End: 2022-06-02

## 2022-06-02 DIAGNOSIS — R92.8 ABNORMAL MAMMOGRAM: ICD-10-CM

## 2022-06-02 PROCEDURE — 0 LIDOCAINE 1 % SOLUTION: Performed by: NURSE PRACTITIONER

## 2022-06-02 PROCEDURE — 88305 TISSUE EXAM BY PATHOLOGIST: CPT | Performed by: NURSE PRACTITIONER

## 2022-06-02 PROCEDURE — A4648 IMPLANTABLE TISSUE MARKER: HCPCS

## 2022-06-02 PROCEDURE — 88342 IMHCHEM/IMCYTCHM 1ST ANTB: CPT | Performed by: NURSE PRACTITIONER

## 2022-06-02 PROCEDURE — 77065 DX MAMMO INCL CAD UNI: CPT | Performed by: RADIOLOGY

## 2022-06-02 PROCEDURE — 88341 IMHCHEM/IMCYTCHM EA ADD ANTB: CPT | Performed by: NURSE PRACTITIONER

## 2022-06-02 PROCEDURE — 19081 BX BREAST 1ST LESION STRTCTC: CPT | Performed by: RADIOLOGY

## 2022-06-02 PROCEDURE — 88360 TUMOR IMMUNOHISTOCHEM/MANUAL: CPT | Performed by: NURSE PRACTITIONER

## 2022-06-02 PROCEDURE — 76098 X-RAY EXAM SURGICAL SPECIMEN: CPT

## 2022-06-02 RX ORDER — LIDOCAINE HYDROCHLORIDE AND EPINEPHRINE 10; 10 MG/ML; UG/ML
10 INJECTION, SOLUTION INFILTRATION; PERINEURAL ONCE
Status: COMPLETED | OUTPATIENT
Start: 2022-06-02 | End: 2022-06-02

## 2022-06-02 RX ORDER — LIDOCAINE HYDROCHLORIDE 10 MG/ML
5 INJECTION, SOLUTION INFILTRATION; PERINEURAL ONCE
Status: COMPLETED | OUTPATIENT
Start: 2022-06-02 | End: 2022-06-02

## 2022-06-02 RX ADMIN — Medication 2 ML: at 15:00

## 2022-06-02 RX ADMIN — LIDOCAINE HYDROCHLORIDE,EPINEPHRINE BITARTRATE 6 ML: 10; .01 INJECTION, SOLUTION INFILTRATION; PERINEURAL at 15:39

## 2022-06-07 LAB
CYTO UR: NORMAL
LAB AP CASE REPORT: NORMAL
LAB AP CLINICAL INFORMATION: NORMAL
LAB AP DIAGNOSIS COMMENT: NORMAL
LAB AP SPECIAL STAINS: NORMAL
PATH REPORT.FINAL DX SPEC: NORMAL
PATH REPORT.GROSS SPEC: NORMAL

## 2022-06-08 ENCOUNTER — TELEPHONE (OUTPATIENT)
Dept: MAMMOGRAPHY | Facility: HOSPITAL | Age: 52
End: 2022-06-08

## 2022-06-08 NOTE — TELEPHONE ENCOUNTER
Referring provider's office notified pathology returned as cancer & patient will be notified.     Patient notified of pathology results and recommendation. Verbalizes understanding. Denies discomfort. Denies signs and symptoms of infection.     Patient desires Dr Yee for surgical consult. Patient notified of surgical consult appointment on 6/22/22 @ 0930. Patient verbalized understanding.    Patient given office contact & location information. Told to bring photo ID, list of prescription & OTC medications and insurance information. Must wear a mask & can bring one person for support also wearing a mask.      Reviewed what would be discussed at surgical consult visit, including detailed explanation of pathology report & imaging reports; availability of Breast Nurse Navigator. Patient encouraged to call back or contact Breast Nurse Navigator, with any questions or concerns. Patient information sent to Genetics/Navigator pool for evaluation & possible referral for genetic counseling.     Breast cancer information packet offered and accepted.

## 2022-06-14 ENCOUNTER — TRANSCRIBE ORDERS (OUTPATIENT)
Dept: OCCUPATIONAL THERAPY | Facility: HOSPITAL | Age: 52
End: 2022-06-14

## 2022-06-14 DIAGNOSIS — C50.911 MALIGNANT NEOPLASM OF RIGHT FEMALE BREAST, UNSPECIFIED ESTROGEN RECEPTOR STATUS, UNSPECIFIED SITE OF BREAST: Primary | ICD-10-CM

## 2022-06-15 ENCOUNTER — CLINICAL SUPPORT (OUTPATIENT)
Dept: GENETICS | Facility: HOSPITAL | Age: 52
End: 2022-06-15

## 2022-06-15 ENCOUNTER — LAB (OUTPATIENT)
Dept: LAB | Facility: HOSPITAL | Age: 52
End: 2022-06-15

## 2022-06-15 ENCOUNTER — NURSE NAVIGATOR (OUTPATIENT)
Dept: ONCOLOGY | Facility: CLINIC | Age: 52
End: 2022-06-15

## 2022-06-15 ENCOUNTER — HOSPITAL ENCOUNTER (OUTPATIENT)
Dept: OCCUPATIONAL THERAPY | Facility: HOSPITAL | Age: 52
Setting detail: THERAPIES SERIES
Discharge: HOME | End: 2022-06-15

## 2022-06-15 VITALS — BODY MASS INDEX: 24.34 KG/M2 | WEIGHT: 170 LBS | HEIGHT: 70 IN

## 2022-06-15 DIAGNOSIS — C50.411 MALIGNANT NEOPLASM OF UPPER-OUTER QUADRANT OF RIGHT BREAST IN FEMALE, ESTROGEN RECEPTOR NEGATIVE: Primary | ICD-10-CM

## 2022-06-15 DIAGNOSIS — Z13.79 GENETIC TESTING: Primary | ICD-10-CM

## 2022-06-15 DIAGNOSIS — Z17.1 MALIGNANT NEOPLASM OF UPPER-OUTER QUADRANT OF RIGHT BREAST IN FEMALE, ESTROGEN RECEPTOR NEGATIVE: Primary | ICD-10-CM

## 2022-06-15 DIAGNOSIS — C50.911 MALIGNANT NEOPLASM OF RIGHT BREAST IN FEMALE, ESTROGEN RECEPTOR NEGATIVE, UNSPECIFIED SITE OF BREAST: ICD-10-CM

## 2022-06-15 DIAGNOSIS — Z80.3 FAMILY HISTORY OF MALIGNANT NEOPLASM OF BREAST: ICD-10-CM

## 2022-06-15 DIAGNOSIS — Z80.42 FAMILY HISTORY OF MALIGNANT NEOPLASM OF PROSTATE: ICD-10-CM

## 2022-06-15 DIAGNOSIS — C50.911 MALIGNANT NEOPLASM OF RIGHT FEMALE BREAST, UNSPECIFIED ESTROGEN RECEPTOR STATUS, UNSPECIFIED SITE OF BREAST: Primary | ICD-10-CM

## 2022-06-15 DIAGNOSIS — Z17.1 MALIGNANT NEOPLASM OF RIGHT BREAST IN FEMALE, ESTROGEN RECEPTOR NEGATIVE, UNSPECIFIED SITE OF BREAST: ICD-10-CM

## 2022-06-15 PROCEDURE — 96040: CPT | Performed by: GENETIC COUNSELOR, MS

## 2022-06-15 PROCEDURE — 97166 OT EVAL MOD COMPLEX 45 MIN: CPT

## 2022-06-15 NOTE — THERAPY DISCHARGE NOTE
Outpatient Occupational Therapy Lymphedema Initial Evaluation/Discharge  [facility]     Patient Name: Lita Pisano  : 1970  MRN: [id number]  Today's Date: 6/15/2022        Visit Date: 06/15/2022    Patient Active Problem List   Diagnosis   • Smokes 1 to 10 cigarettes per day   • Anxiety > depression   • IUD (intrauterine device) in place   • Encounter for removal/ reinsertion of Mirena (IUD) May 17, 2018        Past Medical History:   Diagnosis Date   • Asthma due to environmental allergies    • Depression    • Hyperlipidemia    • Hypertension         Past Surgical History:   Procedure Laterality Date   • AUGMENTATION MAMMAPLASTY      saline, retro   • BREAST AUGMENTATION Bilateral 2019    saline removed and replaced with Gel Dr Kaye   • CHOLECYSTECTOMY           Visit Dx:    ICD-10-CM ICD-9-CM   1. Malignant neoplasm of right female breast, unspecified estrogen receptor status, unspecified site of breast (HCC)  C50.911 174.9            Lymphedema     Row Name 06/15/22 1000             Subjective Pain    Able to rate subjective pain? yes  -SG      Pre-Treatment Pain Level 0  -SG      Post-Treatment Pain Level 0  -SG              Subjective Comments    Subjective Comments Pt reports that she will definitely be having a right mastectomy, likely bilateral. She will be having genetic testing and would like to have consult w/ plastic surgeons. She already has implants, so it is possible that they may be able to preserve those and then once she quits smoking begin process w/ implants. Currently, pt works full time as RN at [employer]. She has full AROM and strength of BUEs.  -SG              Lymphedema Assessment    Lymphedema Classification RUE:;at risk/stage 0  -SG              Posture/Observations    Posture- WNL Posture is WNL  -SG              General ROM    RT Upper Ext Rt Shoulder ABduction;Rt Shoulder Flexion;Rt Shoulder External Rotation;Rt Shoulder Internal Rotation  -SG      LT Upper Ext  Lt Shoulder ABduction;Lt Shoulder Flexion;Lt Shoulder External Rotation;Lt Shoulder Internal Rotation  -SG              Right Upper Ext    Rt Shoulder Abduction AROM 180  -SG      Rt Shoulder Flexion AROM 180  -SG      Rt Shoulder External Rotation AROM WNL  -SG      Rt Shoulder Internal Rotation AROM WNL  -SG              Left Upper Ext    Lt Shoulder Abduction AROM 180  -SG      Lt Shoulder Flexion AROM 180  -SG      Lt Shoulder External Rotation AROM WNL  -SG      Lt Shoulder Internal Rotation AROM WNL  -SG              MMT (Manual Muscle Testing)    General MMT Comments BUE WNL  -SG              Lymphedema Edema Assessment    Edema Assessment Comment Pt does not demo any edema at this time  -SG              Skin Changes/Observations    Location/Assessment Upper Extremity;Upper Quadrant  -SG      Upper Extremity Conditions bilateral:;normal  -SG      Upper Extremity Color/Pigment bilateral:;normal  -SG      Upper Quadrant Conditions bilateral:;normal  -SG      Upper Quadrant Color/Pigment bilateral:;normal  -SG              Lymphedema Sensation    Lymphedema Sensation Reports normal  -SG              L-Dex Bioimpedence Screening    L-Dex Measurement Extremity RUE  -SG      L-Dex Patient Position Standing  -SG      L-Dex UE Dominate Side Right  -SG      L-Dex UE At Risk Side Right  -SG      L-Dex UE Pre Surgical Value Yes  -SG      L-Dex UE Baseline Score 4  -SG      L-Dex UE Comment The patient had a baseline SOZO measurement which I reviewed today. The score is within normal limits, 4, see scanned to EMR. Bioimpedance spectroscopy helps identify the onset of lymphedema in an arm or leg before patients experience noticeable swelling. Research has shown that the early detection of lymphedema using L-Dex combined with treatment can reduce progression to chronic lymphedema by 95% in breast cancer patients. Whenever possible, patients are tested for baseline L-Dex score before cancer treatment begins and then are  "reassessed during regular follow-up visits using the SOZO device. Otherwise, this can be started postoperatively and continued during regular follow-up visits. If the patient’s L-Dex score increases above normal levels, that is a sign that lymphedema is developing and a referral is made to physical therapy for further evaluation and early compression treatment. Lymphedema assessment with the SOZO L-Dex score is recommended to be done every 3 months for the first 3 years and then every 6 months for years 4 and 5 followed by annually afterwards.  -SG      Skeletal Muscle Mass (%) 24.7 %  -SG      Fat Mass (%) 37.7 %  -SG      Hy-dex -6.9  -SG      Height 177.8 cm (70\")  -SG      Weight 77.1 kg (170 lb)  -SG      BMI (Calculated) 24.4  -SG            User Key  (r) = Recorded By, (t) = Taken By, (c) = Cosigned By    Initials Name Provider Type    Socorro Nova OTR/L Occupational Therapist                        Therapy Education  Education Details: Pt edu on prehab evaluation assessments including bioimpedance. Pt was provided w/ HABS materials where she will attend free education class to learn more about lymphedema, exercise, etc.  Given: HEP, Symptoms/condition management, Posture/body mechanics  Program: New  How Provided: Verbal, Written  Provided to: Patient  Level of Understanding: Verbalized             OT Goals     Row Name 06/15/22 1000          OT Short Term Goals    STG Date to Achieve 07/15/22  -SG     STG 1 Pt demonstrates awareness of post-operative movement restrictions and HEP to facilitate lymphatic regeneration and reduce the risk of seroma formation, axillary web syndrome, and lymphedema while ensuring shoulder joint mobility.  -SG     STG 1 Progress Met  -SG     STG 2 Pt demonstrates understanding of post-operative basic lymphedema precautions.  -SG     STG 2 Progress Met  -SG            Time Calculation    OT Goal Re-Cert Due Date 09/13/22  -           User Key  (r) = Recorded By, (t) = Taken " By, (c) = Cosigned By    Initials Name Provider Type    Socorro Nova, OTR/L Occupational Therapist                 OT Assessment/Plan     Row Name 06/15/22 1000          OT Assessment    Assessment Comments Ms. Pisano presents to OT pre-operatively for planned BrCA surgery scheduled in a couple weeks. Baseline ROM, postural, and bioimpedance measurements were taken today to be compared to measurements retaken 3-4 weeks post surgery. At that time, any reduced movement, decline in function, or postural issues will be addressed with skilled care and new goals will be established.  Personal risk factors for lymphedema post-operatively for the R upper extremity and trunk quadrant were also assessed today and basic lymphedema precautions were discussed. A more detailed discussion regarding personal lymphedema risk factors will take place post-operatively once the number of lymph nodes removed and the plan for further medical care is known.  -SG     Please refer to paper survey for additional self-reported information Yes  -SG     OT Diagnosis BrCA  -SG     OT Rehab Potential Excellent  -SG     Patient/caregiver participated in establishment of treatment plan and goals Yes  -SG     Patient would benefit from skilled therapy intervention Yes  -SG            OT Plan    Planned CPT's? OT EVAL MOD COMPLEXITY: 67542;OT THER ACT EA 15 MIN: 61971GP;OT THER PROC EA 15 MIN: 46901JL;OT SELF CARE/MGMT/TRAIN 15 MIN: 10409;OT MANUAL THERAPY EA 15 MIN: 54639;OT BIS XTRACELL FLUID ANALYSIS: 27419  -SG     Planned Therapy Interventions (Optional Details) home exercise program;joint mobilization;manual therapy techniques;patient/family education;postural re-education;ROM (Range of Motion);strengthening;stretching  -SG     OT Plan Comments Ms. Pisano may return to OT 3-4 weeks post operatively for re-evaluation measurements to be compared to measurements taken today, at her pre-operative evaluation. In addition, she will be examined  for possible post-BrCA surgery sequelae such as axillary web syndrome, scar adhesions, edema, worsened posture, scapular winging, pain, and reduced ROM and function. At that time, a future plan and goals will be established and skilled care continued if indicated. Currently, Ms Pisano has been provided with information for the free educational class offered before BrCA surgery in order to facilitate recovery and reduce the risk of post-operative sequelae.  -JACOB           User Key  (r) = Recorded By, (t) = Taken By, (c) = Cosigned By    Initials Name Provider Type    Socorro Nova OTR/L Occupational Therapist                Outcome Measure Options: Quick DASH  Quick DASH  Open a tight or new jar.: Moderate Difficulty  Do heavy household chores (e.g., wash walls, wash floors): No Difficulty  Carry a shopping bag or briefcase: No Difficulty  Wash your back: No Difficulty  Use a knife to cut food: No Difficulty  Recreational activities in which you take some force or impact through your arm, should or hand (e.g. golf, hammering, tennis, etc.): No Difficulty  During the past week, to what extent has your arm, shoulder, or hand problem interfered with your normal social activites with family, friends, neighbors or groups?: Not at all  During the past week, were you limited in your work or other regular daily activities as a result of your arm, shoulder or hand problem?: Not limited at all  Arm, Shoulder, or hand pain: None  Tingling (pins and needles) in your arm, shoulder, or hand: None  During the past week, how much difficulty have you had sleeping because of the pain in your arm, shoulder or hand?: No difficulty  Number of Questions Answered: 11  Quick DASH Score: 4.55         Time Calculation:   OT Start Time: 1000     Therapy Charges for Today     Code Description Service Date Service Provider Modifiers Qty    [id number]  OT EVAL MOD COMPLEXITY 4 6/15/2022 Socorro Carrington OTR/L GO 1                       Socorro  Zeb, NARCISAR/CLAUDIA  6/15/2022

## 2022-06-15 NOTE — PROGRESS NOTES
Patient and  were seen today with Dr. 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 Path report and surgical options discussed. Educational material provided and discussed. Referral to genetics and plastic surgeons will be made. All questions were answered and she will call with any new concerns.

## 2022-06-16 NOTE — PROGRESS NOTES
Lita Pisano, a 51-year-old female, was seen for genetic counseling due to a personal history of breast cancer.  Ms. Pisano was recently diagnosed with a right breast cancer at age 51. She is in the process of making a surgical decision. Ms. Pisano retains her uterus and ovaries. She has never had a colonoscopy. She was interested in discussing her risk for a hereditary cancer syndrome.  Ms. Pisano was interested in pursuing a multi-gene panel to evaluate her risk of cancer, therefore the CancerNext panel was ordered through [email or web address] which analyzes BRCA1/2 and 34 additional genes associated with an increased cancer risk. Results from the high/moderate risk breast cancer genes are expected in 10 days, and results from the remainder of the panel are expected in 2-3 weeks.    PERTINENT FAMILY HISTORY: (See attached pedigree)   Father:   Prostate cancer, 63  Pat. Grandfather: Melanoma  Mat. Uncle:  Lymphoma, 57  Mat. Great Aunt: Breast cancer    Limited paternal family history structure. Records regarding the family history were not provided for review.     RISK ASSESSMENT:  Ms. Pisano’s personal and family history of cancer led to concern for a hereditary cancer syndrome. We discussed BRCA1/2 testing as well as the option of pursuing a panel that would test for other genes known to impact cancer risk in addition to BRCA1/2.   Ms. Pisano clearly meets NCCN guidelines criteria for BRCA1/2 testing based on her personal history of breast cancer and family history of prostate cancer. These risk assessments are based on the family history information provided at the time of the appointment, and could change in the future should new information be obtained.    GENETIC COUNSELING: (30 minutes) We reviewed the family history information in detail. Cases of cancer follow three general patterns: sporadic, familial, and hereditary.  While most cancer is sporadic, some cases appear to occur in family clusters.  These  cases are said to be familial and account for 10-20% of cancer cases.  Familial cases may be due to a combination of shared genes and environmental factors among family members.  In even fewer families (5-10%), the cancer is said to be inherited, and the genes responsible for the cancer are known.      Family histories typical of hereditary cancer syndromes usually include multiple first- and second-degree relatives diagnosed with cancer types that define a syndrome.  These cases tend to be diagnosed at younger-than-expected ages and can be bilateral or multifocal.  The cancer in these families follows an autosomal dominant inheritance pattern, which indicates the likely presence of a mutation in a cancer susceptibility gene.  Children and siblings of an individual believed to carry this mutation have a 50% chance of inheriting that mutation, thereby inheriting the increased risk to develop cancer.  These mutations can be passed down from the maternal or the paternal lineage.    Hereditary breast cancer accounts for 5-10% of all cases of breast cancer.  A significant proportion of hereditary breast and ovarian cancer can be attributed to mutations in the BRCA1 and BRCA2 genes.  Mutations in these genes confer an increased risk for breast cancer, ovarian cancer, male breast cancer, prostate cancer, and pancreatic cancer. Women with a BRCA1 or BRCA2 mutation who have already been diagnosed with breast cancer have a 40-60% lifetime risk of a second breast cancer. Women with a BRCA1 or BRCA2 mutation have up to a 44% risk of ovarian cancer.      There are other genes that are known to be associated with an increased risk for cancer.  Some of these genes have well defined cancer risks and established management guidelines.  Other genes that can be tested for have been more recently described, and there may be less data regarding the risks and therefore may not have established management guidelines. We discussed these  limitations at length.  Based on Ms. Pisano’s desire to get as much information as possible regarding her personal risks and potential risks for her family, she opted to pursue testing through a panel that would look at several other genes known to increase the risk for cancer.    GENETIC TESTING:  The risks, benefits and limitations of genetic testing and implications for clinical management following testing were reviewed.  DNA test results can influence decisions regarding screening, prevention and surgical management.  Genetic testing can have significant psychological implications for both individuals and families.  Also discussed was the possibility of employment and insurance discrimination based on genetic test results and the laws in place to prevent this ([place]).    We discussed panel testing, which would involve testing for BRCA1/2 as well as 34 additional genes that are associated with increased cancer risk. The benefits and limitations of genetic testing were discussed and Ms. Pisano decided to pursue testing via the panel. The implications of a positive or negative test result were discussed. We discussed the possibility that, in some cases, genetic test results may be informative or may be ambiguous due to the identification of a genetic variant. These variants may or may not be associated with an increased cancer risk.  With multigene panel testing, it is not uncommon for a variant of uncertain significance (VUS) to be identified.  If a VUS is identified, testing family members is typically not recommended and screening recommendations are made based on the family history.  The laboratories that perform genetic testing work to reclassify the VUS and send out an amended report if and when a VUS is reclassified.  The majority of variant findings are ultimately reclassified to a negative result.  Given her personal and family history, a negative test result would not eliminate all cancer risk to her  relatives, although the risk would not be as high as it would with positive genetic testing.      PLAN: Genetic testing via the CancerNext panel through [employer] was ordered. Results from the high/moderate risk breast cancer genes are expected in 10 days, and results from the remainder of the panel are expected in 2-3 weeks. Ms. Pisano is welcome to contact us in the meantime with any questions she may have at [phone].      Josselyn Byrd MS, [facility], [facility]  Licensed Certified Genetic Counselor

## 2022-06-17 ENCOUNTER — TRANSCRIBE ORDERS (OUTPATIENT)
Dept: ADMINISTRATIVE | Facility: HOSPITAL | Age: 52
End: 2022-06-17

## 2022-06-17 ENCOUNTER — APPOINTMENT (OUTPATIENT)
Dept: MAMMOGRAPHY | Facility: HOSPITAL | Age: 52
End: 2022-06-17

## 2022-06-17 DIAGNOSIS — Z11.59 SCREENING FOR VIRAL DISEASE: ICD-10-CM

## 2022-06-17 DIAGNOSIS — C50.411 MALIGNANT NEOPLASM OF UPPER-OUTER QUADRANT OF RIGHT FEMALE BREAST, UNSPECIFIED ESTROGEN RECEPTOR STATUS: Primary | ICD-10-CM

## 2022-06-21 ENCOUNTER — TELEPHONE (OUTPATIENT)
Dept: GENETICS | Facility: HOSPITAL | Age: 52
End: 2022-06-21

## 2022-06-21 ENCOUNTER — PRE-ADMISSION TESTING (OUTPATIENT)
Dept: PREADMISSION TESTING | Facility: HOSPITAL | Age: 52
End: 2022-06-21

## 2022-06-21 VITALS — BODY MASS INDEX: 24.51 KG/M2 | WEIGHT: 171.19 LBS | HEIGHT: 70 IN

## 2022-06-21 LAB
ALBUMIN SERPL-MCNC: 4.8 G/DL (ref 3.5–5.2)
ALBUMIN/GLOB SERPL: 2.3 G/DL
ALP SERPL-CCNC: 54 U/L (ref 39–117)
ALT SERPL W P-5'-P-CCNC: 14 U/L (ref 1–33)
ANION GAP SERPL CALCULATED.3IONS-SCNC: 10 MMOL/L (ref 5–15)
AST SERPL-CCNC: 14 U/L (ref 1–32)
BILIRUB SERPL-MCNC: 0.3 MG/DL (ref 0–1.2)
BUN SERPL-MCNC: 20 MG/DL (ref 6–20)
BUN/CREAT SERPL: 23.8 (ref 7–25)
CALCIUM SPEC-SCNC: 9.3 MG/DL (ref 8.6–10.5)
CHLORIDE SERPL-SCNC: 103 MMOL/L (ref 98–107)
CO2 SERPL-SCNC: 29 MMOL/L (ref 22–29)
CREAT SERPL-MCNC: 0.84 MG/DL (ref 0.57–1)
DEPRECATED RDW RBC AUTO: 43.4 FL (ref 37–54)
EGFRCR SERPLBLD CKD-EPI 2021: 84.8 ML/MIN/1.73
ERYTHROCYTE [DISTWIDTH] IN BLOOD BY AUTOMATED COUNT: 12.8 % (ref 12.3–15.4)
GLOBULIN UR ELPH-MCNC: 2.1 GM/DL
GLUCOSE SERPL-MCNC: 97 MG/DL (ref 65–99)
HBA1C MFR BLD: 5.2 % (ref 4.8–5.6)
HCT VFR BLD AUTO: 38.3 % (ref 34–46.6)
HGB BLD-MCNC: 13.3 G/DL (ref 12–15.9)
MCH RBC QN AUTO: 32.2 PG (ref 26.6–33)
MCHC RBC AUTO-ENTMCNC: 34.7 G/DL (ref 31.5–35.7)
MCV RBC AUTO: 92.7 FL (ref 79–97)
PLATELET # BLD AUTO: 223 10*3/MM3 (ref 140–450)
PMV BLD AUTO: 10.5 FL (ref 6–12)
POTASSIUM SERPL-SCNC: 4.1 MMOL/L (ref 3.5–5.2)
PROT SERPL-MCNC: 6.9 G/DL (ref 6–8.5)
RBC # BLD AUTO: 4.13 10*6/MM3 (ref 3.77–5.28)
SODIUM SERPL-SCNC: 142 MMOL/L (ref 136–145)
WBC NRBC COR # BLD: 8.04 10*3/MM3 (ref 3.4–10.8)

## 2022-06-21 PROCEDURE — 36415 COLL VENOUS BLD VENIPUNCTURE: CPT

## 2022-06-21 PROCEDURE — 83036 HEMOGLOBIN GLYCOSYLATED A1C: CPT

## 2022-06-21 PROCEDURE — 85027 COMPLETE CBC AUTOMATED: CPT

## 2022-06-21 PROCEDURE — 93005 ELECTROCARDIOGRAM TRACING: CPT

## 2022-06-21 PROCEDURE — 93010 ELECTROCARDIOGRAM REPORT: CPT | Performed by: INTERNAL MEDICINE

## 2022-06-21 PROCEDURE — 80053 COMPREHEN METABOLIC PANEL: CPT

## 2022-06-21 RX ORDER — ESTRADIOL 0.05 MG/D
1 FILM, EXTENDED RELEASE TRANSDERMAL 2 TIMES WEEKLY
COMMUNITY
Start: 2022-05-12

## 2022-06-21 RX ORDER — PHENOL 1.4 %
10 AEROSOL, SPRAY (ML) MUCOUS MEMBRANE NIGHTLY
COMMUNITY

## 2022-06-21 NOTE — PAT
Patient viewed general PAT education video as instructed in their preoperative information received from their surgeon.  Patient stated the general [facility] education video was viewed in its entirety and survey completed.  Copies of [facility] general education handouts (Incentive Spirometry, Meds to Beds Program, Patient Belongings, Pre-op skin preparation instructions, Blood Glucose testing, Visitor policy, Surgery FAQ, Code H) distributed to patient if not printed. Education related to the PAT pass and skin preparation for surgery (if applicable) completed in [facility] as a reinforcement to PAT education video. Patient instructed to return PAT pass provided today as well as completed skin preparation sheet (if applicable) on the day of procedure.     Additionally if patient had not viewed video yet but intended to view it at home or in our waiting area, then referred them to the handout with QR code/link provided during PAT visit.  Instructed patient to complete survey after viewing the video in its entirety.  Encouraged patient/family to read [facility] general education handouts thoroughly and notify PAT staff with any questions or concerns. Patient verbalized understanding of all information and priority content.    Patient instructed to drink 20 ounces (or until full) of Gatorade and it needs to be completed 1 hour (for Main OR patients) or 2 hours (scheduled  section patients) before given arrival time for procedure (NO RED Gatorade)    Patient verbalized understanding.    Patient to apply Chlorhexadine wipes  to surgical area (as instructed) the night before procedure and the AM of procedure. Wipes provided.    Patient denies any current skin issues.     It was noted during Pre Admission Testing that patient was wearing some form of fingernail polish (gel/regular) and/or acrylic/artificial nails.  Patient was told that polish and/or artificial nails must be removed for surgery.  If a patient had recent manicure, and would  rather not remove polish or artificial nails. Then the minimum requirement is that the polish/artificial nails must be removed from the middle finger on each hand.  Patient verbalized understanding.    If patient was having surgery on an upper extremity, then the patient was instructed that fingernail polish/artificial fingernails must be removed for surgery.  NO EXCEPTIONS.  Patient verbalized understanding.    Patient is aware of COVID testing appointment at [facility] on 6/26/22.

## 2022-06-21 NOTE — TELEPHONE ENCOUNTER
Patient contacted by HUDSON Eubanks to discuss results of genetic testing.   The high/moderate breast cancer risk portion of the panel was resulted out first in order to expedite surgical decision making, and testing was negative for mutations in BRCA1/2, SHANDA, CHEK2, CDH1, PALB2, PTEN, and TP53.  The remainder of the CancerNext panel, including lower risk genes, non-breast cancer related genes, and RNA analysis is still pending, and the patient will be contacted once those results are available. Full summary note, pedigree, and results will be sent to patient, referring provider, and treating physicians once full panel results are back.    Josselyn Byrd MS, [facility], [facility]  Licensed Certified Genetic Counselor    Cc: Sterling Yee MD

## 2022-06-21 NOTE — TELEPHONE ENCOUNTER
Called patient to inform them their first wave of genetic testing (BRCAplus) had come back as negative. Informed patient remaining genetic panel would be back probably within the next week. Informed patient results would be posted on [employer] and sent to Dr. CASTELLANOS

## 2022-06-22 LAB
QT INTERVAL: 406 MS
QTC INTERVAL: 415 MS

## 2022-06-26 ENCOUNTER — CLINICAL SUPPORT NO REQUIREMENTS (OUTPATIENT)
Dept: PREADMISSION TESTING | Facility: HOSPITAL | Age: 52
End: 2022-06-26

## 2022-06-26 DIAGNOSIS — Z11.59 SCREENING FOR VIRAL DISEASE: ICD-10-CM

## 2022-06-26 LAB — SARS-COV-2 RNA PNL SPEC NAA+PROBE: NOT DETECTED

## 2022-06-26 PROCEDURE — C9803 HOPD COVID-19 SPEC COLLECT: HCPCS

## 2022-06-26 PROCEDURE — U0004 COV-19 TEST NON-CDC HGH THRU: HCPCS

## 2022-06-27 ENCOUNTER — ANESTHESIA EVENT (OUTPATIENT)
Dept: PERIOP | Facility: HOSPITAL | Age: 52
End: 2022-06-27

## 2022-06-27 RX ORDER — SODIUM CHLORIDE 0.9 % (FLUSH) 0.9 %
10 SYRINGE (ML) INJECTION AS NEEDED
Status: CANCELLED | OUTPATIENT
Start: 2022-06-27

## 2022-06-27 RX ORDER — FAMOTIDINE 10 MG/ML
20 INJECTION, SOLUTION INTRAVENOUS ONCE
Status: CANCELLED | OUTPATIENT
Start: 2022-06-27 | End: 2022-06-27

## 2022-06-27 RX ORDER — SODIUM CHLORIDE 0.9 % (FLUSH) 0.9 %
10 SYRINGE (ML) INJECTION EVERY 12 HOURS SCHEDULED
Status: CANCELLED | OUTPATIENT
Start: 2022-06-27

## 2022-06-28 ENCOUNTER — HOSPITAL ENCOUNTER (OUTPATIENT)
Dept: NUCLEAR MEDICINE | Facility: HOSPITAL | Age: 52
Discharge: HOME | End: 2022-06-28

## 2022-06-28 ENCOUNTER — HOSPITAL ENCOUNTER (OUTPATIENT)
Facility: HOSPITAL | Age: 52
Discharge: HOME | End: 2022-06-29
Attending: SURGERY | Admitting: SURGERY

## 2022-06-28 ENCOUNTER — ANESTHESIA EVENT CONVERTED (OUTPATIENT)
Dept: ANESTHESIOLOGY | Facility: HOSPITAL | Age: 52
End: 2022-06-28

## 2022-06-28 ENCOUNTER — ANESTHESIA (OUTPATIENT)
Dept: PERIOP | Facility: HOSPITAL | Age: 52
End: 2022-06-28

## 2022-06-28 DIAGNOSIS — C50.411 MALIGNANT NEOPLASM OF UPPER-OUTER QUADRANT OF RIGHT FEMALE BREAST: Primary | ICD-10-CM

## 2022-06-28 DIAGNOSIS — C50.411 MALIGNANT NEOPLASM OF UPPER-OUTER QUADRANT OF RIGHT FEMALE BREAST, UNSPECIFIED ESTROGEN RECEPTOR STATUS: ICD-10-CM

## 2022-06-28 LAB
B-HCG UR QL: NEGATIVE
EXPIRATION DATE: NORMAL
INTERNAL NEGATIVE CONTROL: NEGATIVE
INTERNAL POSITIVE CONTROL: POSITIVE
Lab: NORMAL

## 2022-06-28 PROCEDURE — 88307 TISSUE EXAM BY PATHOLOGIST: CPT | Performed by: SURGERY

## 2022-06-28 PROCEDURE — 25010000002 FENTANYL CITRATE (PF) 50 MCG/ML SOLUTION: Performed by: NURSE ANESTHETIST, CERTIFIED REGISTERED

## 2022-06-28 PROCEDURE — S0260 H&P FOR SURGERY: HCPCS | Performed by: PHYSICIAN ASSISTANT

## 2022-06-28 PROCEDURE — 88342 IMHCHEM/IMCYTCHM 1ST ANTB: CPT | Performed by: SURGERY

## 2022-06-28 PROCEDURE — 0 LIDOCAINE 1 % SOLUTION: Performed by: NURSE ANESTHETIST, CERTIFIED REGISTERED

## 2022-06-28 PROCEDURE — 25010000002 ONDANSETRON PER 1 MG

## 2022-06-28 PROCEDURE — 25010000002 PROPOFOL 10 MG/ML EMULSION: Performed by: NURSE ANESTHETIST, CERTIFIED REGISTERED

## 2022-06-28 PROCEDURE — 81025 URINE PREGNANCY TEST: CPT | Performed by: SURGERY

## 2022-06-28 PROCEDURE — 25010000002 CEFAZOLIN IN DEXTROSE 2-4 GM/100ML-% SOLUTION: Performed by: SURGERY

## 2022-06-28 PROCEDURE — 38792 RA TRACER ID OF SENTINL NODE: CPT

## 2022-06-28 PROCEDURE — 63710000001 PROMETHAZINE PER 12.5 MG: Performed by: SURGERY

## 2022-06-28 PROCEDURE — 25010000002 FENTANYL CITRATE (PF) 50 MCG/ML SOLUTION

## 2022-06-28 PROCEDURE — 0 TECHNETIUM FILTERED SULFUR COLLOID: Performed by: SURGERY

## 2022-06-28 PROCEDURE — 88341 IMHCHEM/IMCYTCHM EA ADD ANTB: CPT | Performed by: SURGERY

## 2022-06-28 PROCEDURE — 25010000002 MIDAZOLAM PER 1 MG: Performed by: NURSE ANESTHETIST, CERTIFIED REGISTERED

## 2022-06-28 PROCEDURE — 25010000002 DEXAMETHASONE SODIUM PHOSPHATE 10 MG/ML SOLUTION: Performed by: NURSE ANESTHETIST, CERTIFIED REGISTERED

## 2022-06-28 PROCEDURE — 25010000002 ONDANSETRON PER 1 MG: Performed by: NURSE ANESTHETIST, CERTIFIED REGISTERED

## 2022-06-28 PROCEDURE — 88331 PATH CONSLTJ SURG 1 BLK 1SPC: CPT | Performed by: PATHOLOGY

## 2022-06-28 PROCEDURE — A9541 TC99M SULFUR COLLOID: HCPCS | Performed by: SURGERY

## 2022-06-28 PROCEDURE — 25010000002 HYDROMORPHONE 1 MG/ML SOLUTION

## 2022-06-28 PROCEDURE — 19303 MAST SIMPLE COMPLETE: CPT

## 2022-06-28 RX ORDER — ENALAPRILAT 2.5 MG/2ML
1.25 INJECTION INTRAVENOUS EVERY 6 HOURS PRN
Status: DISCONTINUED | OUTPATIENT
Start: 2022-06-28 | End: 2022-06-29 | Stop reason: HOSPADM

## 2022-06-28 RX ORDER — MAGNESIUM HYDROXIDE 1200 MG/15ML
LIQUID ORAL AS NEEDED
Status: DISCONTINUED | OUTPATIENT
Start: 2022-06-28 | End: 2022-06-28 | Stop reason: HOSPADM

## 2022-06-28 RX ORDER — MELOXICAM 7.5 MG/1
15 TABLET ORAL DAILY
Status: DISCONTINUED | OUTPATIENT
Start: 2022-06-28 | End: 2022-06-29 | Stop reason: HOSPADM

## 2022-06-28 RX ORDER — FENTANYL CITRATE 50 UG/ML
50 INJECTION, SOLUTION INTRAMUSCULAR; INTRAVENOUS
Status: DISCONTINUED | OUTPATIENT
Start: 2022-06-28 | End: 2022-06-28 | Stop reason: HOSPADM

## 2022-06-28 RX ORDER — PROMETHAZINE HYDROCHLORIDE 12.5 MG/1
6.25 TABLET ORAL EVERY 6 HOURS PRN
Status: DISCONTINUED | OUTPATIENT
Start: 2022-06-28 | End: 2022-06-29 | Stop reason: HOSPADM

## 2022-06-28 RX ORDER — DIAZEPAM 2 MG/1
2 TABLET ORAL EVERY 8 HOURS PRN
Status: DISCONTINUED | OUTPATIENT
Start: 2022-06-28 | End: 2022-06-29 | Stop reason: HOSPADM

## 2022-06-28 RX ORDER — SCOLOPAMINE TRANSDERMAL SYSTEM 1 MG/1
1 PATCH, EXTENDED RELEASE TRANSDERMAL CONTINUOUS
Status: DISCONTINUED | OUTPATIENT
Start: 2022-06-28 | End: 2022-06-29 | Stop reason: HOSPADM

## 2022-06-28 RX ORDER — LIDOCAINE HYDROCHLORIDE 20 MG/ML
JELLY TOPICAL AS NEEDED
Status: DISCONTINUED | OUTPATIENT
Start: 2022-06-28 | End: 2022-06-28 | Stop reason: SURG

## 2022-06-28 RX ORDER — MONTELUKAST SODIUM 10 MG/1
10 TABLET ORAL NIGHTLY
Status: DISCONTINUED | OUTPATIENT
Start: 2022-06-28 | End: 2022-06-29 | Stop reason: HOSPADM

## 2022-06-28 RX ORDER — NALOXONE HCL 0.4 MG/ML
0.1 VIAL (ML) INJECTION
Status: DISCONTINUED | OUTPATIENT
Start: 2022-06-28 | End: 2022-06-29 | Stop reason: HOSPADM

## 2022-06-28 RX ORDER — PROMETHAZINE HYDROCHLORIDE 25 MG/1
25 SUPPOSITORY RECTAL ONCE AS NEEDED
Status: DISCONTINUED | OUTPATIENT
Start: 2022-06-28 | End: 2022-06-28 | Stop reason: HOSPADM

## 2022-06-28 RX ORDER — MIDAZOLAM HYDROCHLORIDE 1 MG/ML
1 INJECTION INTRAMUSCULAR; INTRAVENOUS
Status: DISCONTINUED | OUTPATIENT
Start: 2022-06-28 | End: 2022-06-28 | Stop reason: HOSPADM

## 2022-06-28 RX ORDER — ATORVASTATIN CALCIUM 10 MG/1
10 TABLET, FILM COATED ORAL DAILY
Status: DISCONTINUED | OUTPATIENT
Start: 2022-06-28 | End: 2022-06-29 | Stop reason: HOSPADM

## 2022-06-28 RX ORDER — FAMOTIDINE 20 MG/1
20 TABLET, FILM COATED ORAL ONCE
Status: COMPLETED | OUTPATIENT
Start: 2022-06-28 | End: 2022-06-28

## 2022-06-28 RX ORDER — LIDOCAINE HYDROCHLORIDE 10 MG/ML
INJECTION, SOLUTION INFILTRATION; PERINEURAL AS NEEDED
Status: DISCONTINUED | OUTPATIENT
Start: 2022-06-28 | End: 2022-06-28 | Stop reason: SURG

## 2022-06-28 RX ORDER — LIDOCAINE HYDROCHLORIDE 10 MG/ML
0.5 INJECTION, SOLUTION EPIDURAL; INFILTRATION; INTRACAUDAL; PERINEURAL ONCE AS NEEDED
Status: DISCONTINUED | OUTPATIENT
Start: 2022-06-28 | End: 2022-06-28 | Stop reason: HOSPADM

## 2022-06-28 RX ORDER — FENTANYL CITRATE 50 UG/ML
INJECTION, SOLUTION INTRAMUSCULAR; INTRAVENOUS AS NEEDED
Status: DISCONTINUED | OUTPATIENT
Start: 2022-06-28 | End: 2022-06-28 | Stop reason: SURG

## 2022-06-28 RX ORDER — ROCURONIUM BROMIDE 10 MG/ML
INJECTION, SOLUTION INTRAVENOUS AS NEEDED
Status: DISCONTINUED | OUTPATIENT
Start: 2022-06-28 | End: 2022-06-28 | Stop reason: SURG

## 2022-06-28 RX ORDER — CEFAZOLIN SODIUM 2 G/100ML
2 INJECTION, SOLUTION INTRAVENOUS EVERY 8 HOURS SCHEDULED
Status: COMPLETED | OUTPATIENT
Start: 2022-06-28 | End: 2022-06-28

## 2022-06-28 RX ORDER — HYDROMORPHONE HYDROCHLORIDE 1 MG/ML
0.5 INJECTION, SOLUTION INTRAMUSCULAR; INTRAVENOUS; SUBCUTANEOUS
Status: DISCONTINUED | OUTPATIENT
Start: 2022-06-28 | End: 2022-06-28 | Stop reason: HOSPADM

## 2022-06-28 RX ORDER — SODIUM CHLORIDE, SODIUM LACTATE, POTASSIUM CHLORIDE, CALCIUM CHLORIDE 600; 310; 30; 20 MG/100ML; MG/100ML; MG/100ML; MG/100ML
9 INJECTION, SOLUTION INTRAVENOUS CONTINUOUS
Status: DISCONTINUED | OUTPATIENT
Start: 2022-06-28 | End: 2022-06-29 | Stop reason: HOSPADM

## 2022-06-28 RX ORDER — ONDANSETRON 2 MG/ML
INJECTION INTRAMUSCULAR; INTRAVENOUS
Status: COMPLETED
Start: 2022-06-28 | End: 2022-06-28

## 2022-06-28 RX ORDER — PROMETHAZINE HYDROCHLORIDE 25 MG/1
25 TABLET ORAL ONCE AS NEEDED
Status: DISCONTINUED | OUTPATIENT
Start: 2022-06-28 | End: 2022-06-28 | Stop reason: HOSPADM

## 2022-06-28 RX ORDER — PREGABALIN 75 MG/1
75 CAPSULE ORAL ONCE
Status: COMPLETED | OUTPATIENT
Start: 2022-06-28 | End: 2022-06-28

## 2022-06-28 RX ORDER — HYDROCHLOROTHIAZIDE 12.5 MG/1
12.5 CAPSULE, GELATIN COATED ORAL EVERY OTHER DAY
Status: DISCONTINUED | OUTPATIENT
Start: 2022-06-29 | End: 2022-06-29 | Stop reason: HOSPADM

## 2022-06-28 RX ORDER — DEXAMETHASONE SODIUM PHOSPHATE 10 MG/ML
INJECTION, SOLUTION INTRAMUSCULAR; INTRAVENOUS
Status: COMPLETED | OUTPATIENT
Start: 2022-06-28 | End: 2022-06-28

## 2022-06-28 RX ORDER — NEBIVOLOL 20 MG/1
20 TABLET ORAL EVERY MORNING
Status: DISCONTINUED | OUTPATIENT
Start: 2022-06-29 | End: 2022-06-29 | Stop reason: HOSPADM

## 2022-06-28 RX ORDER — PROMETHAZINE HYDROCHLORIDE 12.5 MG/1
6.25 SUPPOSITORY RECTAL EVERY 6 HOURS PRN
Status: DISCONTINUED | OUTPATIENT
Start: 2022-06-28 | End: 2022-06-29 | Stop reason: HOSPADM

## 2022-06-28 RX ORDER — CEFAZOLIN SODIUM 2 G/100ML
2 INJECTION, SOLUTION INTRAVENOUS ONCE
Status: COMPLETED | OUTPATIENT
Start: 2022-06-28 | End: 2022-06-28

## 2022-06-28 RX ORDER — PROPOFOL 10 MG/ML
VIAL (ML) INTRAVENOUS AS NEEDED
Status: DISCONTINUED | OUTPATIENT
Start: 2022-06-28 | End: 2022-06-28 | Stop reason: SURG

## 2022-06-28 RX ORDER — MELOXICAM 15 MG/1
15 TABLET ORAL ONCE
Status: COMPLETED | OUTPATIENT
Start: 2022-06-28 | End: 2022-06-28

## 2022-06-28 RX ORDER — OXYCODONE HYDROCHLORIDE 5 MG/1
5 TABLET ORAL EVERY 4 HOURS PRN
Status: DISCONTINUED | OUTPATIENT
Start: 2022-06-28 | End: 2022-06-29 | Stop reason: HOSPADM

## 2022-06-28 RX ORDER — ONDANSETRON 2 MG/ML
INJECTION INTRAMUSCULAR; INTRAVENOUS AS NEEDED
Status: DISCONTINUED | OUTPATIENT
Start: 2022-06-28 | End: 2022-06-28 | Stop reason: SURG

## 2022-06-28 RX ORDER — BUPIVACAINE HYDROCHLORIDE 2.5 MG/ML
INJECTION, SOLUTION EPIDURAL; INFILTRATION; INTRACAUDAL
Status: COMPLETED | OUTPATIENT
Start: 2022-06-28 | End: 2022-06-28

## 2022-06-28 RX ORDER — FENTANYL CITRATE 50 UG/ML
INJECTION, SOLUTION INTRAMUSCULAR; INTRAVENOUS
Status: COMPLETED
Start: 2022-06-28 | End: 2022-06-28

## 2022-06-28 RX ORDER — ACETAMINOPHEN 500 MG
1000 TABLET ORAL EVERY 6 HOURS SCHEDULED
Status: DISCONTINUED | OUTPATIENT
Start: 2022-06-28 | End: 2022-06-29 | Stop reason: HOSPADM

## 2022-06-28 RX ORDER — ACETAMINOPHEN 500 MG
1000 TABLET ORAL ONCE
Status: COMPLETED | OUTPATIENT
Start: 2022-06-28 | End: 2022-06-28

## 2022-06-28 RX ORDER — MIDAZOLAM HYDROCHLORIDE 1 MG/ML
INJECTION INTRAMUSCULAR; INTRAVENOUS AS NEEDED
Status: DISCONTINUED | OUTPATIENT
Start: 2022-06-28 | End: 2022-06-28 | Stop reason: SURG

## 2022-06-28 RX ORDER — SODIUM CHLORIDE 9 MG/ML
50 INJECTION, SOLUTION INTRAVENOUS CONTINUOUS
Status: DISCONTINUED | OUTPATIENT
Start: 2022-06-28 | End: 2022-06-29

## 2022-06-28 RX ORDER — ONDANSETRON 2 MG/ML
4 INJECTION INTRAMUSCULAR; INTRAVENOUS ONCE AS NEEDED
Status: COMPLETED | OUTPATIENT
Start: 2022-06-28 | End: 2022-06-28

## 2022-06-28 RX ORDER — DIPHENHYDRAMINE HYDROCHLORIDE 50 MG/ML
12.5 INJECTION INTRAMUSCULAR; INTRAVENOUS EVERY 6 HOURS PRN
Status: DISCONTINUED | OUTPATIENT
Start: 2022-06-28 | End: 2022-06-29 | Stop reason: HOSPADM

## 2022-06-28 RX ORDER — ONDANSETRON 2 MG/ML
4 INJECTION INTRAMUSCULAR; INTRAVENOUS EVERY 6 HOURS PRN
Status: DISCONTINUED | OUTPATIENT
Start: 2022-06-28 | End: 2022-06-29 | Stop reason: HOSPADM

## 2022-06-28 RX ADMIN — DEXAMETHASONE SODIUM PHOSPHATE 8 MG: 10 INJECTION, SOLUTION INTRAMUSCULAR; INTRAVENOUS at 07:49

## 2022-06-28 RX ADMIN — FENTANYL CITRATE 100 MCG: 50 INJECTION, SOLUTION INTRAMUSCULAR; INTRAVENOUS at 07:34

## 2022-06-28 RX ADMIN — MELOXICAM 15 MG: 15 TABLET ORAL at 07:22

## 2022-06-28 RX ADMIN — PROPOFOL 150 MG: 10 INJECTION, EMULSION INTRAVENOUS at 07:34

## 2022-06-28 RX ADMIN — ONDANSETRON 4 MG: 2 INJECTION INTRAMUSCULAR; INTRAVENOUS at 10:28

## 2022-06-28 RX ADMIN — FENTANYL CITRATE 50 MCG: 50 INJECTION, SOLUTION INTRAMUSCULAR; INTRAVENOUS at 10:20

## 2022-06-28 RX ADMIN — ACETAMINOPHEN 1000 MG: 500 TABLET ORAL at 12:41

## 2022-06-28 RX ADMIN — SCOPALAMINE 1 PATCH: 1 PATCH, EXTENDED RELEASE TRANSDERMAL at 07:22

## 2022-06-28 RX ADMIN — PREGABALIN 75 MG: 75 CAPSULE ORAL at 07:22

## 2022-06-28 RX ADMIN — HYDROMORPHONE HYDROCHLORIDE 0.5 MG: 1 INJECTION, SOLUTION INTRAMUSCULAR; INTRAVENOUS; SUBCUTANEOUS at 10:36

## 2022-06-28 RX ADMIN — CEFAZOLIN SODIUM 2 G: 2 INJECTION, SOLUTION INTRAVENOUS at 14:39

## 2022-06-28 RX ADMIN — DEXAMETHASONE SODIUM PHOSPHATE 2 MG: 10 INJECTION, SOLUTION INTRAMUSCULAR; INTRAVENOUS at 07:36

## 2022-06-28 RX ADMIN — MIDAZOLAM HYDROCHLORIDE 1 MG: 1 INJECTION, SOLUTION INTRAMUSCULAR; INTRAVENOUS at 07:30

## 2022-06-28 RX ADMIN — FENTANYL CITRATE 50 MCG: 50 INJECTION, SOLUTION INTRAMUSCULAR; INTRAVENOUS at 10:26

## 2022-06-28 RX ADMIN — ONDANSETRON 4 MG: 2 INJECTION INTRAMUSCULAR; INTRAVENOUS at 09:09

## 2022-06-28 RX ADMIN — MONTELUKAST 10 MG: 10 TABLET, FILM COATED ORAL at 21:35

## 2022-06-28 RX ADMIN — SUGAMMADEX 200 MG: 100 INJECTION, SOLUTION INTRAVENOUS at 09:48

## 2022-06-28 RX ADMIN — TECHNETIUM TC 99M SULFUR COLLOID 1 DOSE: KIT at 07:35

## 2022-06-28 RX ADMIN — HYDROMORPHONE HYDROCHLORIDE 0.5 MG: 1 INJECTION, SOLUTION INTRAMUSCULAR; INTRAVENOUS; SUBCUTANEOUS at 11:01

## 2022-06-28 RX ADMIN — SODIUM CHLORIDE, POTASSIUM CHLORIDE, SODIUM LACTATE AND CALCIUM CHLORIDE 9 ML/HR: 600; 310; 30; 20 INJECTION, SOLUTION INTRAVENOUS at 07:22

## 2022-06-28 RX ADMIN — MIDAZOLAM HYDROCHLORIDE 1 MG: 1 INJECTION, SOLUTION INTRAMUSCULAR; INTRAVENOUS at 07:25

## 2022-06-28 RX ADMIN — BUPIVACAINE HYDROCHLORIDE 30 ML: 2.5 INJECTION, SOLUTION EPIDURAL; INFILTRATION; INTRACAUDAL; PERINEURAL at 07:36

## 2022-06-28 RX ADMIN — CEFAZOLIN SODIUM 2 G: 2 INJECTION, SOLUTION INTRAVENOUS at 21:35

## 2022-06-28 RX ADMIN — ACETAMINOPHEN 1000 MG: 500 TABLET ORAL at 07:22

## 2022-06-28 RX ADMIN — FAMOTIDINE 20 MG: 20 TABLET ORAL at 07:22

## 2022-06-28 RX ADMIN — ACETAMINOPHEN 1000 MG: 500 TABLET ORAL at 23:20

## 2022-06-28 RX ADMIN — PROPOFOL 25 MCG/KG/MIN: 10 INJECTION, EMULSION INTRAVENOUS at 07:37

## 2022-06-28 RX ADMIN — ROCURONIUM BROMIDE 30 MG: 10 INJECTION, SOLUTION INTRAVENOUS at 07:34

## 2022-06-28 RX ADMIN — PROMETHAZINE HYDROCHLORIDE 6.25 MG: 12.5 TABLET ORAL at 12:41

## 2022-06-28 RX ADMIN — ACETAMINOPHEN 1000 MG: 500 TABLET ORAL at 17:50

## 2022-06-28 RX ADMIN — CEFAZOLIN SODIUM 2 G: 2 INJECTION, SOLUTION INTRAVENOUS at 07:30

## 2022-06-28 RX ADMIN — SODIUM CHLORIDE 50 ML/HR: 9 INJECTION, SOLUTION INTRAVENOUS at 11:39

## 2022-06-28 RX ADMIN — LIDOCAINE HYDROCHLORIDE 2 ML: 20 JELLY TOPICAL at 07:37

## 2022-06-28 RX ADMIN — LIDOCAINE HYDROCHLORIDE 50 MG: 10 INJECTION, SOLUTION INFILTRATION; PERINEURAL at 07:34

## 2022-06-28 NOTE — ANESTHESIA POSTPROCEDURE EVALUATION
Patient: Lita Pisano    Procedure Summary     Date: 06/28/22 Room / Location:  [facility] /  [facility]    Anesthesia Start: 0730 Anesthesia Stop: 1006    Procedure: BREAST SKIN SPARING  MASTECTOMY WITH SENTINEL NODE BIOPSY- RIGHT (Right Breast) Diagnosis: Malignant neoplasm of upper-outer quadrant of right female breast    Surgeons: Sterling Yee MD Provider: Tio Aguillon MD    Anesthesia Type: general with block ASA Status: 3          Anesthesia Type: general with block    Vitals  Vitals Value Taken Time   /74 06/28/22 1005   Temp 98.2 °F (36.8 °C) 06/28/22 1005   Pulse 78 06/28/22 1005   Resp 18 06/28/22 1005   SpO2 100 % 06/28/22 1005           Post Anesthesia Care and Evaluation    Patient location during evaluation: PACU  Patient participation: complete - patient participated  Level of consciousness: awake and alert  Pain management: adequate    Airway patency: patent  Anesthetic complications: No anesthetic complications  PONV Status: none  Cardiovascular status: hemodynamically stable and acceptable  Respiratory status: nonlabored ventilation, acceptable and nasal cannula  Hydration status: acceptable

## 2022-06-28 NOTE — ANESTHESIA PREPROCEDURE EVALUATION
Anesthesia Evaluation     Patient summary reviewed and Nursing notes reviewed   no history of anesthetic complications:  NPO Solid Status: > 8 hours  NPO Liquid Status: > 2 hours           Airway   Mallampati: II  TM distance: >3 FB  Neck ROM: full  No difficulty expected  Dental - normal exam     Pulmonary - normal exam    breath sounds clear to auscultation  (+) a smoker (1/2 ppd) Current,   Cardiovascular - normal exam    ECG reviewed  Rhythm: regular  Rate: normal    (+) hypertension,       Neuro/Psych- negative ROS  GI/Hepatic/Renal/Endo - negative ROS     Musculoskeletal (-) negative ROS    Abdominal    Substance History      OB/GYN          Other      history of cancer (Breast ca)                    Anesthesia Plan    ASA 3     general with block     (Right PECS I&II block post-induction for post-operative analgesia per request of Dr. FULLER)  intravenous induction     Anesthetic plan, risks, benefits, and alternatives have been provided, discussed and informed consent has been obtained with: patient.    Plan discussed with CRNA.        CODE STATUS:

## 2022-06-28 NOTE — ANESTHESIA PROCEDURE NOTES
Peripheral Block      Patient reassessed immediately prior to procedure    Patient location during procedure: OR  Reason for block: at surgeon's request and post-op pain management  Performed by  Anesthesiologist: Tio Aguillon MD  Preanesthetic Checklist  Completed: patient identified, IV checked, site marked, risks and benefits discussed, surgical consent, monitors and equipment checked, pre-op evaluation and timeout performed  Prep:  Pt Position: supine  Sterile barriers:cap, gloves, gown and mask  Prep: ChloraPrep  Patient monitoring: blood pressure monitoring, continuous pulse oximetry and EKG  Procedure  Performed under: general  Guidance:ultrasound guided and landmark technique  Images:still images obtained, printed/placed on chart    Laterality:right  Block Type:PECS I and PECS II  Injection Technique:single-shot  Needle Type:short-bevel  Needle Gauge:20 G  Resistance on Injection: none    Medications Used: dexamethasone sodium phosphate injection, 2 mg  bupivacaine PF (MARCAINE) 0.25 % injection, 30 mL  Med administered at 6/28/2022 7:36 AM      Medications  Preservative Free Saline:10ml  Comment:      Post Assessment  Injection Assessment: negative aspiration for heme and incremental injection  Patient Tolerance:comfortable throughout block  Complications:no  Additional Notes  The pt. Was placed in the Supine Position and GA was induced     The insertion site was prepped with CHG and Ultrasound guidance with In-Plane techniquewas  a 4inch BBraun 360 degree echogenic needle was visualized.  Normal Saline PSF was  utilized for hydrodissection of tissue. PECS 1 Block- Pectoralis Major and Minor where identified and LA was injected between PMM and PmM at the level of the 3rd Rib(10ml),  PECS 2-  Pectoralis Minor and Serratus muscle where identified and the needle was advanced laterally in-plane with the 4th rib as a backstop, pleura was monitored.  LA was injected between SA and PmM at the level of 4th rib(  20ml).  LA injection spread was visualized, injection was incremental 1-5ml, normal or low injection pressure, no intravascular injection, no pneumothorax appreciated.  Thank You.

## 2022-06-29 ENCOUNTER — TELEPHONE (OUTPATIENT)
Dept: GENETICS | Facility: HOSPITAL | Age: 52
End: 2022-06-29

## 2022-06-29 ENCOUNTER — DOCUMENTATION (OUTPATIENT)
Dept: GENETICS | Facility: HOSPITAL | Age: 52
End: 2022-06-29

## 2022-06-29 VITALS
HEART RATE: 57 BPM | WEIGHT: 171 LBS | HEIGHT: 70 IN | SYSTOLIC BLOOD PRESSURE: 108 MMHG | RESPIRATION RATE: 17 BRPM | TEMPERATURE: 98.1 F | DIASTOLIC BLOOD PRESSURE: 66 MMHG | OXYGEN SATURATION: 93 % | BODY MASS INDEX: 24.48 KG/M2

## 2022-06-29 RX ORDER — OXYCODONE HYDROCHLORIDE 5 MG/1
5 TABLET ORAL EVERY 8 HOURS PRN
Qty: 7 TABLET | Refills: 0 | Status: SHIPPED | OUTPATIENT
Start: 2022-06-29 | End: 2022-07-02

## 2022-06-29 RX ADMIN — ATORVASTATIN CALCIUM 10 MG: 10 TABLET, FILM COATED ORAL at 08:26

## 2022-06-29 RX ADMIN — DIAZEPAM 2 MG: 2 TABLET ORAL at 04:36

## 2022-06-29 RX ADMIN — ACETAMINOPHEN 1000 MG: 500 TABLET ORAL at 05:25

## 2022-06-29 RX ADMIN — HYDROCHLOROTHIAZIDE 12.5 MG: 12.5 CAPSULE ORAL at 08:26

## 2022-06-29 RX ADMIN — MELOXICAM 15 MG: 7.5 TABLET ORAL at 08:26

## 2022-06-29 NOTE — TELEPHONE ENCOUNTER
Spoke with patient and informed her the CancerNext panel we ordered all came back as negative. Informed patient results would be on [employer] and sent to her Dr's office.

## 2022-07-05 LAB
CYTO UR: NORMAL
LAB AP CASE REPORT: NORMAL
LAB AP CLINICAL INFORMATION: NORMAL
LAB AP DIAGNOSIS COMMENT: NORMAL
Lab: NORMAL
PATH REPORT.FINAL DX SPEC: NORMAL
PATH REPORT.GROSS SPEC: NORMAL

## 2022-12-30 ENCOUNTER — PRE-ADMISSION TESTING (OUTPATIENT)
Dept: PREADMISSION TESTING | Facility: HOSPITAL | Age: 52
End: 2022-12-30
Payer: COMMERCIAL

## 2022-12-30 LAB
ALBUMIN SERPL-MCNC: 5 G/DL (ref 3.5–5.2)
ALBUMIN/GLOB SERPL: 2.2 G/DL
ALP SERPL-CCNC: 67 U/L (ref 39–117)
ALT SERPL W P-5'-P-CCNC: 16 U/L (ref 1–33)
ANION GAP SERPL CALCULATED.3IONS-SCNC: 11 MMOL/L (ref 5–15)
AST SERPL-CCNC: 16 U/L (ref 1–32)
BILIRUB SERPL-MCNC: 0.3 MG/DL (ref 0–1.2)
BUN SERPL-MCNC: 15 MG/DL (ref 6–20)
BUN/CREAT SERPL: 23.8 (ref 7–25)
CALCIUM SPEC-SCNC: 9.4 MG/DL (ref 8.6–10.5)
CHLORIDE SERPL-SCNC: 100 MMOL/L (ref 98–107)
CO2 SERPL-SCNC: 29 MMOL/L (ref 22–29)
CREAT SERPL-MCNC: 0.63 MG/DL (ref 0.57–1)
DEPRECATED RDW RBC AUTO: 40.5 FL (ref 37–54)
EGFRCR SERPLBLD CKD-EPI 2021: 107.6 ML/MIN/1.73
ERYTHROCYTE [DISTWIDTH] IN BLOOD BY AUTOMATED COUNT: 12.2 % (ref 12.3–15.4)
GLOBULIN UR ELPH-MCNC: 2.3 GM/DL
GLUCOSE SERPL-MCNC: 87 MG/DL (ref 65–99)
HBA1C MFR BLD: 5.2 % (ref 4.8–5.6)
HCT VFR BLD AUTO: 38.3 % (ref 34–46.6)
HGB BLD-MCNC: 13.2 G/DL (ref 12–15.9)
MCH RBC QN AUTO: 31.7 PG (ref 26.6–33)
MCHC RBC AUTO-ENTMCNC: 34.5 G/DL (ref 31.5–35.7)
MCV RBC AUTO: 91.8 FL (ref 79–97)
PLATELET # BLD AUTO: 227 10*3/MM3 (ref 140–450)
PMV BLD AUTO: 9.8 FL (ref 6–12)
POTASSIUM SERPL-SCNC: 3.6 MMOL/L (ref 3.5–5.2)
PROT SERPL-MCNC: 7.3 G/DL (ref 6–8.5)
QT INTERVAL: 408 MS
QTC INTERVAL: 427 MS
RBC # BLD AUTO: 4.17 10*6/MM3 (ref 3.77–5.28)
SODIUM SERPL-SCNC: 140 MMOL/L (ref 136–145)
WBC NRBC COR # BLD: 7.18 10*3/MM3 (ref 3.4–10.8)

## 2022-12-30 PROCEDURE — 85027 COMPLETE CBC AUTOMATED: CPT

## 2022-12-30 PROCEDURE — 83036 HEMOGLOBIN GLYCOSYLATED A1C: CPT

## 2022-12-30 PROCEDURE — 93010 ELECTROCARDIOGRAM REPORT: CPT | Performed by: INTERNAL MEDICINE

## 2022-12-30 PROCEDURE — 36415 COLL VENOUS BLD VENIPUNCTURE: CPT

## 2022-12-30 PROCEDURE — 80053 COMPREHEN METABOLIC PANEL: CPT

## 2022-12-30 PROCEDURE — 93005 ELECTROCARDIOGRAM TRACING: CPT

## 2022-12-30 NOTE — PAT
Patient viewed general PAT education video as instructed in their preoperative information received from their surgeon.  Patient stated the general PAT education video was viewed in its entirety and survey completed.  Copies of [facility] general education handouts (Incentive Spirometry, Meds to Beds Program, Patient Belongings, Pre-op skin preparation instructions, Blood Glucose testing, Visitor policy, Surgery FAQ, Code H) distributed to patient if not printed. Education related to the PAT pass and skin preparation for surgery (if applicable) completed in PAT as a reinforcement to PAT education video. Patient instructed to return PAT pass provided today as well as completed skin preparation sheet (if applicable) on the day of procedure.     Additionally if patient had not viewed video yet but intended to view it at home or in our waiting area, then referred them to the handout with QR code/link provided during PAT visit.  Instructed patient to complete survey after viewing the video in its entirety.  Encouraged patient/family to read [facility] general education handouts thoroughly and notify PAT staff with any questions or concerns. Patient verbalized understanding of all information and priority content.    Patient to apply Chlorhexadine wipes  to surgical area (as instructed) the night before procedure and the AM of procedure. Wipes provided.    Patient instructed to drink 20 ounces of Gatorade and it needs to be completed 1 hour (for Main OR patients) or 2 hours (scheduled  section & [facility] patients) before given arrival time for procedure (NO RED Gatorade)    Patient verbalized understanding.

## 2023-01-09 ENCOUNTER — ANESTHESIA EVENT (OUTPATIENT)
Dept: PERIOP | Facility: HOSPITAL | Age: 53
End: 2023-01-09
Payer: COMMERCIAL

## 2023-01-09 RX ORDER — SODIUM CHLORIDE 0.9 % (FLUSH) 0.9 %
10 SYRINGE (ML) INJECTION EVERY 12 HOURS SCHEDULED
Status: CANCELLED | OUTPATIENT
Start: 2023-01-09

## 2023-01-09 RX ORDER — SODIUM CHLORIDE 9 MG/ML
40 INJECTION, SOLUTION INTRAVENOUS AS NEEDED
Status: CANCELLED | OUTPATIENT
Start: 2023-01-09

## 2023-01-09 RX ORDER — SODIUM CHLORIDE 0.9 % (FLUSH) 0.9 %
10 SYRINGE (ML) INJECTION AS NEEDED
Status: CANCELLED | OUTPATIENT
Start: 2023-01-09

## 2023-01-09 RX ORDER — FAMOTIDINE 10 MG/ML
20 INJECTION, SOLUTION INTRAVENOUS ONCE
Status: CANCELLED | OUTPATIENT
Start: 2023-01-09 | End: 2023-01-09

## 2023-01-10 ENCOUNTER — HOSPITAL ENCOUNTER (OUTPATIENT)
Facility: HOSPITAL | Age: 53
Discharge: HOME | End: 2023-01-11
Attending: SURGERY | Admitting: SURGERY
Payer: COMMERCIAL

## 2023-01-10 ENCOUNTER — ANESTHESIA EVENT CONVERTED (OUTPATIENT)
Dept: ANESTHESIOLOGY | Facility: HOSPITAL | Age: 53
End: 2023-01-10
Payer: COMMERCIAL

## 2023-01-10 ENCOUNTER — ANESTHESIA (OUTPATIENT)
Dept: PERIOP | Facility: HOSPITAL | Age: 53
End: 2023-01-10
Payer: COMMERCIAL

## 2023-01-10 DIAGNOSIS — C50.919 BREAST CANCER: ICD-10-CM

## 2023-01-10 LAB
B-HCG UR QL: NEGATIVE
BASE EXCESS BLDA CALC-SCNC: -2 MMOL/L (ref -5–5)
CA-I BLDA-SCNC: 1.21 MMOL/L (ref 1.2–1.32)
CO2 BLDA-SCNC: 25 MMOL/L (ref 24–29)
EXPIRATION DATE: NORMAL
GLUCOSE BLDC GLUCOMTR-MCNC: 64 MG/DL (ref 70–130)
HCO3 BLDA-SCNC: 23.9 MMOL/L (ref 22–26)
HCT VFR BLDA CALC: 31 % (ref 38–51)
HGB BLDA-MCNC: 10.5 G/DL (ref 12–17)
INTERNAL NEGATIVE CONTROL: NORMAL
INTERNAL POSITIVE CONTROL: NORMAL
Lab: 20
PCO2 BLDA: 44.4 MM HG (ref 35–45)
PH BLDA: 7.34 PH UNITS (ref 7.35–7.6)
PO2 BLDA: 28 MMHG (ref 80–105)
POTASSIUM BLDA-SCNC: 4.2 MMOL/L (ref 3.5–4.9)
SAO2 % BLDA: 49 % (ref 95–98)
SODIUM BLD-SCNC: 140 MMOL/L (ref 138–146)

## 2023-01-10 PROCEDURE — 25010000002 NEOSTIGMINE 10 MG/10ML SOLUTION: Performed by: NURSE ANESTHETIST, CERTIFIED REGISTERED

## 2023-01-10 PROCEDURE — 82803 BLOOD GASES ANY COMBINATION: CPT

## 2023-01-10 PROCEDURE — 82947 ASSAY GLUCOSE BLOOD QUANT: CPT

## 2023-01-10 PROCEDURE — 82330 ASSAY OF CALCIUM: CPT

## 2023-01-10 PROCEDURE — C1789 PROSTHESIS, BREAST, IMP: HCPCS | Performed by: SURGERY

## 2023-01-10 PROCEDURE — 25010000002 FENTANYL CITRATE (PF) 50 MCG/ML SOLUTION

## 2023-01-10 PROCEDURE — 81025 URINE PREGNANCY TEST: CPT | Performed by: SURGERY

## 2023-01-10 PROCEDURE — 84295 ASSAY OF SERUM SODIUM: CPT

## 2023-01-10 PROCEDURE — 25010000002 GENTAMICIN PER 80 MG: Performed by: SURGERY

## 2023-01-10 PROCEDURE — 25010000002 CEFAZOLIN IN DEXTROSE 2-4 GM/100ML-% SOLUTION: Performed by: SURGERY

## 2023-01-10 PROCEDURE — 25010000002 HYDROMORPHONE 1 MG/ML SOLUTION

## 2023-01-10 PROCEDURE — 25010000002 CEFAZOLIN PER 500 MG: Performed by: PLASTIC SURGERY

## 2023-01-10 PROCEDURE — 88305 TISSUE EXAM BY PATHOLOGIST: CPT | Performed by: SURGERY

## 2023-01-10 PROCEDURE — 25010000002 CEFAZOLIN PER 500 MG: Performed by: SURGERY

## 2023-01-10 PROCEDURE — 25010000002 MIDAZOLAM PER 1 MG: Performed by: ANESTHESIOLOGY

## 2023-01-10 PROCEDURE — 85014 HEMATOCRIT: CPT

## 2023-01-10 PROCEDURE — 25010000002 DEXAMETHASONE SODIUM PHOSPHATE 10 MG/ML SOLUTION: Performed by: NURSE ANESTHETIST, CERTIFIED REGISTERED

## 2023-01-10 PROCEDURE — 25010000002 BUPRENORPHINE PER 0.1 MG: Performed by: NURSE ANESTHETIST, CERTIFIED REGISTERED

## 2023-01-10 PROCEDURE — 25010000002 ONDANSETRON PER 1 MG: Performed by: NURSE ANESTHETIST, CERTIFIED REGISTERED

## 2023-01-10 PROCEDURE — 25010000002 GENTAMICIN PER 80 MG: Performed by: PLASTIC SURGERY

## 2023-01-10 PROCEDURE — 84132 ASSAY OF SERUM POTASSIUM: CPT

## 2023-01-10 PROCEDURE — 88307 TISSUE EXAM BY PATHOLOGIST: CPT | Performed by: SURGERY

## 2023-01-10 PROCEDURE — 25010000002 PROPOFOL 10 MG/ML EMULSION: Performed by: NURSE ANESTHETIST, CERTIFIED REGISTERED

## 2023-01-10 DEVICE — GRFT TISS ALLODERM RTM PERF 16X20CM 2.4MM/THK .4MM: Type: IMPLANTABLE DEVICE | Site: BREAST | Status: FUNCTIONAL

## 2023-01-10 DEVICE — EXPNDR TISS BRST F/HT V/P STL 133S FV/T 600CC: Type: IMPLANTABLE DEVICE | Site: BREAST | Status: FUNCTIONAL

## 2023-01-10 DEVICE — DEV CONTRL TISS STRATAFIX SPIRAL MNCRYL UD 3/0 PLS 30CM: Type: IMPLANTABLE DEVICE | Site: BREAST | Status: FUNCTIONAL

## 2023-01-10 RX ORDER — MAGNESIUM HYDROXIDE 1200 MG/15ML
LIQUID ORAL AS NEEDED
Status: DISCONTINUED | OUTPATIENT
Start: 2023-01-10 | End: 2023-01-10 | Stop reason: HOSPADM

## 2023-01-10 RX ORDER — NEOSTIGMINE METHYLSULFATE 1 MG/ML
INJECTION, SOLUTION INTRAVENOUS AS NEEDED
Status: DISCONTINUED | OUTPATIENT
Start: 2023-01-10 | End: 2023-01-10 | Stop reason: SURG

## 2023-01-10 RX ORDER — MONTELUKAST SODIUM 10 MG/1
10 TABLET ORAL NIGHTLY
Status: DISCONTINUED | OUTPATIENT
Start: 2023-01-10 | End: 2023-01-11 | Stop reason: HOSPADM

## 2023-01-10 RX ORDER — CEFAZOLIN SODIUM 2 G/100ML
2 INJECTION, SOLUTION INTRAVENOUS EVERY 8 HOURS
Status: DISCONTINUED | OUTPATIENT
Start: 2023-01-10 | End: 2023-01-11 | Stop reason: HOSPADM

## 2023-01-10 RX ORDER — EPHEDRINE SULFATE 50 MG/ML
INJECTION INTRAVENOUS AS NEEDED
Status: DISCONTINUED | OUTPATIENT
Start: 2023-01-10 | End: 2023-01-10 | Stop reason: SURG

## 2023-01-10 RX ORDER — PREGABALIN 75 MG/1
75 CAPSULE ORAL ONCE
Status: COMPLETED | OUTPATIENT
Start: 2023-01-10 | End: 2023-01-10

## 2023-01-10 RX ORDER — ONDANSETRON 2 MG/ML
4 INJECTION INTRAMUSCULAR; INTRAVENOUS ONCE AS NEEDED
Status: DISCONTINUED | OUTPATIENT
Start: 2023-01-10 | End: 2023-01-10 | Stop reason: HOSPADM

## 2023-01-10 RX ORDER — ATORVASTATIN CALCIUM 10 MG/1
10 TABLET, FILM COATED ORAL NIGHTLY
Status: DISCONTINUED | OUTPATIENT
Start: 2023-01-10 | End: 2023-01-11 | Stop reason: HOSPADM

## 2023-01-10 RX ORDER — OXYCODONE HYDROCHLORIDE 5 MG/1
5 TABLET ORAL ONCE AS NEEDED
Status: DISCONTINUED | OUTPATIENT
Start: 2023-01-10 | End: 2023-01-10 | Stop reason: HOSPADM

## 2023-01-10 RX ORDER — ACETAMINOPHEN 325 MG/1
650 TABLET ORAL ONCE AS NEEDED
Status: DISCONTINUED | OUTPATIENT
Start: 2023-01-10 | End: 2023-01-10 | Stop reason: HOSPADM

## 2023-01-10 RX ORDER — PROMETHAZINE HYDROCHLORIDE 12.5 MG/1
6.25 SUPPOSITORY RECTAL EVERY 6 HOURS PRN
Status: DISCONTINUED | OUTPATIENT
Start: 2023-01-10 | End: 2023-01-11 | Stop reason: HOSPADM

## 2023-01-10 RX ORDER — DEXAMETHASONE SODIUM PHOSPHATE 10 MG/ML
INJECTION, SOLUTION INTRAMUSCULAR; INTRAVENOUS
Status: COMPLETED | OUTPATIENT
Start: 2023-01-10 | End: 2023-01-10

## 2023-01-10 RX ORDER — DIAZEPAM 2 MG/1
2 TABLET ORAL EVERY 8 HOURS PRN
Status: DISCONTINUED | OUTPATIENT
Start: 2023-01-10 | End: 2023-01-11 | Stop reason: HOSPADM

## 2023-01-10 RX ORDER — EPHEDRINE SULFATE 50 MG/ML
5 INJECTION, SOLUTION INTRAVENOUS ONCE AS NEEDED
Status: DISCONTINUED | OUTPATIENT
Start: 2023-01-10 | End: 2023-01-10 | Stop reason: HOSPADM

## 2023-01-10 RX ORDER — LIDOCAINE HYDROCHLORIDE 10 MG/ML
0.5 INJECTION, SOLUTION EPIDURAL; INFILTRATION; INTRACAUDAL; PERINEURAL ONCE AS NEEDED
Status: COMPLETED | OUTPATIENT
Start: 2023-01-10 | End: 2023-01-10

## 2023-01-10 RX ORDER — OXYCODONE HYDROCHLORIDE 5 MG/1
5 TABLET ORAL EVERY 4 HOURS PRN
Status: DISCONTINUED | OUTPATIENT
Start: 2023-01-10 | End: 2023-01-11 | Stop reason: HOSPADM

## 2023-01-10 RX ORDER — BUPIVACAINE HYDROCHLORIDE 2.5 MG/ML
INJECTION, SOLUTION EPIDURAL; INFILTRATION; INTRACAUDAL
Status: COMPLETED | OUTPATIENT
Start: 2023-01-10 | End: 2023-01-10

## 2023-01-10 RX ORDER — ONDANSETRON 2 MG/ML
INJECTION INTRAMUSCULAR; INTRAVENOUS AS NEEDED
Status: DISCONTINUED | OUTPATIENT
Start: 2023-01-10 | End: 2023-01-10 | Stop reason: SURG

## 2023-01-10 RX ORDER — NEBIVOLOL 10 MG/1
10 TABLET ORAL EVERY MORNING
Status: DISCONTINUED | OUTPATIENT
Start: 2023-01-11 | End: 2023-01-11 | Stop reason: HOSPADM

## 2023-01-10 RX ORDER — PROMETHAZINE HYDROCHLORIDE 12.5 MG/1
6.25 TABLET ORAL EVERY 6 HOURS PRN
Status: DISCONTINUED | OUTPATIENT
Start: 2023-01-10 | End: 2023-01-11 | Stop reason: HOSPADM

## 2023-01-10 RX ORDER — GLYCOPYRROLATE 0.2 MG/ML
INJECTION INTRAMUSCULAR; INTRAVENOUS AS NEEDED
Status: DISCONTINUED | OUTPATIENT
Start: 2023-01-10 | End: 2023-01-10 | Stop reason: SURG

## 2023-01-10 RX ORDER — NALOXONE HCL 0.4 MG/ML
0.1 VIAL (ML) INJECTION
Status: DISCONTINUED | OUTPATIENT
Start: 2023-01-10 | End: 2023-01-11 | Stop reason: HOSPADM

## 2023-01-10 RX ORDER — SODIUM CHLORIDE 9 MG/ML
INJECTION, SOLUTION INTRAVENOUS AS NEEDED
Status: DISCONTINUED | OUTPATIENT
Start: 2023-01-10 | End: 2023-01-10 | Stop reason: HOSPADM

## 2023-01-10 RX ORDER — FENTANYL CITRATE 50 UG/ML
INJECTION, SOLUTION INTRAMUSCULAR; INTRAVENOUS
Status: COMPLETED
Start: 2023-01-10 | End: 2023-01-10

## 2023-01-10 RX ORDER — ENALAPRILAT 2.5 MG/2ML
1.25 INJECTION INTRAVENOUS EVERY 6 HOURS PRN
Status: DISCONTINUED | OUTPATIENT
Start: 2023-01-10 | End: 2023-01-11 | Stop reason: HOSPADM

## 2023-01-10 RX ORDER — MIDAZOLAM HYDROCHLORIDE 1 MG/ML
2 INJECTION INTRAMUSCULAR; INTRAVENOUS ONCE
Status: COMPLETED | OUTPATIENT
Start: 2023-01-10 | End: 2023-01-10

## 2023-01-10 RX ORDER — SODIUM CHLORIDE, SODIUM LACTATE, POTASSIUM CHLORIDE, CALCIUM CHLORIDE 600; 310; 30; 20 MG/100ML; MG/100ML; MG/100ML; MG/100ML
9 INJECTION, SOLUTION INTRAVENOUS CONTINUOUS
Status: DISCONTINUED | OUTPATIENT
Start: 2023-01-10 | End: 2023-01-11 | Stop reason: HOSPADM

## 2023-01-10 RX ORDER — BUPRENORPHINE HYDROCHLORIDE 0.32 MG/ML
INJECTION INTRAMUSCULAR; INTRAVENOUS
Status: COMPLETED | OUTPATIENT
Start: 2023-01-10 | End: 2023-01-10

## 2023-01-10 RX ORDER — HYDROCHLOROTHIAZIDE 12.5 MG/1
12.5 CAPSULE, GELATIN COATED ORAL EVERY OTHER DAY
Status: DISCONTINUED | OUTPATIENT
Start: 2023-01-11 | End: 2023-01-11 | Stop reason: HOSPADM

## 2023-01-10 RX ORDER — MIDAZOLAM HYDROCHLORIDE 1 MG/ML
1 INJECTION INTRAMUSCULAR; INTRAVENOUS
Status: DISCONTINUED | OUTPATIENT
Start: 2023-01-10 | End: 2023-01-10

## 2023-01-10 RX ORDER — ACETAMINOPHEN 500 MG
1000 TABLET ORAL EVERY 6 HOURS
Status: DISCONTINUED | OUTPATIENT
Start: 2023-01-10 | End: 2023-01-11 | Stop reason: HOSPADM

## 2023-01-10 RX ORDER — ONDANSETRON 2 MG/ML
4 INJECTION INTRAMUSCULAR; INTRAVENOUS EVERY 6 HOURS PRN
Status: DISCONTINUED | OUTPATIENT
Start: 2023-01-10 | End: 2023-01-11 | Stop reason: HOSPADM

## 2023-01-10 RX ORDER — ESMOLOL HYDROCHLORIDE 10 MG/ML
INJECTION INTRAVENOUS AS NEEDED
Status: DISCONTINUED | OUTPATIENT
Start: 2023-01-10 | End: 2023-01-10 | Stop reason: SURG

## 2023-01-10 RX ORDER — FAMOTIDINE 20 MG/1
20 TABLET, FILM COATED ORAL ONCE
Status: COMPLETED | OUTPATIENT
Start: 2023-01-10 | End: 2023-01-10

## 2023-01-10 RX ORDER — LIDOCAINE HYDROCHLORIDE 10 MG/ML
INJECTION, SOLUTION EPIDURAL; INFILTRATION; INTRACAUDAL; PERINEURAL AS NEEDED
Status: DISCONTINUED | OUTPATIENT
Start: 2023-01-10 | End: 2023-01-10 | Stop reason: SURG

## 2023-01-10 RX ORDER — LABETALOL HYDROCHLORIDE 5 MG/ML
5 INJECTION, SOLUTION INTRAVENOUS
Status: DISCONTINUED | OUTPATIENT
Start: 2023-01-10 | End: 2023-01-10 | Stop reason: HOSPADM

## 2023-01-10 RX ORDER — DEXAMETHASONE SODIUM PHOSPHATE 10 MG/ML
INJECTION, SOLUTION INTRAMUSCULAR; INTRAVENOUS AS NEEDED
Status: DISCONTINUED | OUTPATIENT
Start: 2023-01-10 | End: 2023-01-10 | Stop reason: SURG

## 2023-01-10 RX ORDER — ACETAMINOPHEN 650 MG/1
650 SUPPOSITORY RECTAL ONCE AS NEEDED
Status: DISCONTINUED | OUTPATIENT
Start: 2023-01-10 | End: 2023-01-10 | Stop reason: HOSPADM

## 2023-01-10 RX ORDER — PROPOFOL 10 MG/ML
VIAL (ML) INTRAVENOUS AS NEEDED
Status: DISCONTINUED | OUTPATIENT
Start: 2023-01-10 | End: 2023-01-10 | Stop reason: SURG

## 2023-01-10 RX ORDER — ROCURONIUM BROMIDE 10 MG/ML
INJECTION, SOLUTION INTRAVENOUS AS NEEDED
Status: DISCONTINUED | OUTPATIENT
Start: 2023-01-10 | End: 2023-01-10 | Stop reason: SURG

## 2023-01-10 RX ORDER — CEFAZOLIN SODIUM 2 G/100ML
2 INJECTION, SOLUTION INTRAVENOUS ONCE
Status: COMPLETED | OUTPATIENT
Start: 2023-01-10 | End: 2023-01-10

## 2023-01-10 RX ORDER — DIPHENHYDRAMINE HYDROCHLORIDE 50 MG/ML
12.5 INJECTION INTRAMUSCULAR; INTRAVENOUS EVERY 6 HOURS PRN
Status: DISCONTINUED | OUTPATIENT
Start: 2023-01-10 | End: 2023-01-11 | Stop reason: HOSPADM

## 2023-01-10 RX ORDER — HYDROMORPHONE HYDROCHLORIDE 1 MG/ML
0.5 INJECTION, SOLUTION INTRAMUSCULAR; INTRAVENOUS; SUBCUTANEOUS
Status: DISCONTINUED | OUTPATIENT
Start: 2023-01-10 | End: 2023-01-10 | Stop reason: HOSPADM

## 2023-01-10 RX ORDER — SCOLOPAMINE TRANSDERMAL SYSTEM 1 MG/1
1 PATCH, EXTENDED RELEASE TRANSDERMAL CONTINUOUS
Status: DISCONTINUED | OUTPATIENT
Start: 2023-01-10 | End: 2023-01-11 | Stop reason: HOSPADM

## 2023-01-10 RX ORDER — FENTANYL CITRATE 50 UG/ML
50 INJECTION, SOLUTION INTRAMUSCULAR; INTRAVENOUS
Status: DISCONTINUED | OUTPATIENT
Start: 2023-01-10 | End: 2023-01-10 | Stop reason: HOSPADM

## 2023-01-10 RX ORDER — MELOXICAM 15 MG/1
15 TABLET ORAL DAILY
Status: DISCONTINUED | OUTPATIENT
Start: 2023-01-11 | End: 2023-01-10

## 2023-01-10 RX ORDER — SODIUM CHLORIDE 9 MG/ML
50 INJECTION, SOLUTION INTRAVENOUS CONTINUOUS
Status: DISCONTINUED | OUTPATIENT
Start: 2023-01-10 | End: 2023-01-11 | Stop reason: HOSPADM

## 2023-01-10 RX ORDER — PHENYLEPHRINE HCL IN 0.9% NACL 1 MG/10 ML
SYRINGE (ML) INTRAVENOUS AS NEEDED
Status: DISCONTINUED | OUTPATIENT
Start: 2023-01-10 | End: 2023-01-10 | Stop reason: SURG

## 2023-01-10 RX ADMIN — BUPIVACAINE HYDROCHLORIDE 60 ML: 2.5 INJECTION, SOLUTION EPIDURAL; INFILTRATION; INTRACAUDAL; PERINEURAL at 13:49

## 2023-01-10 RX ADMIN — ATORVASTATIN CALCIUM 10 MG: 10 TABLET, FILM COATED ORAL at 21:02

## 2023-01-10 RX ADMIN — EPHEDRINE SULFATE 15 MG: 50 INJECTION INTRAVENOUS at 15:48

## 2023-01-10 RX ADMIN — HYDROMORPHONE HYDROCHLORIDE 0.5 MG: 1 INJECTION, SOLUTION INTRAMUSCULAR; INTRAVENOUS; SUBCUTANEOUS at 17:50

## 2023-01-10 RX ADMIN — PREGABALIN 75 MG: 75 CAPSULE ORAL at 11:49

## 2023-01-10 RX ADMIN — MIDAZOLAM 2 MG: 1 INJECTION INTRAMUSCULAR; INTRAVENOUS at 11:54

## 2023-01-10 RX ADMIN — HYDROMORPHONE HYDROCHLORIDE 0.5 MG: 1 INJECTION, SOLUTION INTRAMUSCULAR; INTRAVENOUS; SUBCUTANEOUS at 17:17

## 2023-01-10 RX ADMIN — OXYCODONE HYDROCHLORIDE 5 MG: 5 TABLET ORAL at 21:07

## 2023-01-10 RX ADMIN — ROCURONIUM BROMIDE 50 MG: 10 INJECTION, SOLUTION INTRAVENOUS at 13:47

## 2023-01-10 RX ADMIN — ONDANSETRON 4 MG: 2 INJECTION INTRAMUSCULAR; INTRAVENOUS at 16:14

## 2023-01-10 RX ADMIN — ESMOLOL HYDROCHLORIDE 30 MG: 10 INJECTION, SOLUTION INTRAVENOUS at 13:47

## 2023-01-10 RX ADMIN — CEFAZOLIN SODIUM 2 G: 2 INJECTION, SOLUTION INTRAVENOUS at 21:02

## 2023-01-10 RX ADMIN — DEXAMETHASONE SODIUM PHOSPHATE 6 MG: 10 INJECTION, SOLUTION INTRAMUSCULAR; INTRAVENOUS at 13:56

## 2023-01-10 RX ADMIN — FENTANYL CITRATE 50 MCG: 50 INJECTION, SOLUTION INTRAMUSCULAR; INTRAVENOUS at 16:52

## 2023-01-10 RX ADMIN — SODIUM CHLORIDE, POTASSIUM CHLORIDE, SODIUM LACTATE AND CALCIUM CHLORIDE: 600; 310; 30; 20 INJECTION, SOLUTION INTRAVENOUS at 15:43

## 2023-01-10 RX ADMIN — Medication 100 MCG: at 16:07

## 2023-01-10 RX ADMIN — SODIUM CHLORIDE 50 ML/HR: 9 INJECTION, SOLUTION INTRAVENOUS at 18:34

## 2023-01-10 RX ADMIN — PROPOFOL 200 MG: 10 INJECTION, EMULSION INTRAVENOUS at 13:47

## 2023-01-10 RX ADMIN — GLYCOPYRROLATE 0.4 MG: 0.2 INJECTION INTRAMUSCULAR; INTRAVENOUS at 16:18

## 2023-01-10 RX ADMIN — CEFAZOLIN SODIUM 2 G: 2 INJECTION, SOLUTION INTRAVENOUS at 13:45

## 2023-01-10 RX ADMIN — SODIUM CHLORIDE, POTASSIUM CHLORIDE, SODIUM LACTATE AND CALCIUM CHLORIDE 9 ML/HR: 600; 310; 30; 20 INJECTION, SOLUTION INTRAVENOUS at 11:51

## 2023-01-10 RX ADMIN — LIDOCAINE HYDROCHLORIDE 50 MG: 10 INJECTION, SOLUTION EPIDURAL; INFILTRATION; INTRACAUDAL; PERINEURAL at 13:47

## 2023-01-10 RX ADMIN — NEOSTIGMINE 3 MG: 1 INJECTION INTRAVENOUS at 16:18

## 2023-01-10 RX ADMIN — BUPRENORPHINE HYDROCHLORIDE 0.3 MG: 0.32 INJECTION INTRAMUSCULAR; INTRAVENOUS at 13:49

## 2023-01-10 RX ADMIN — MONTELUKAST 10 MG: 10 TABLET, FILM COATED ORAL at 21:02

## 2023-01-10 RX ADMIN — FAMOTIDINE 20 MG: 20 TABLET, FILM COATED ORAL at 11:49

## 2023-01-10 RX ADMIN — ACETAMINOPHEN 1000 MG: 500 TABLET, FILM COATED ORAL at 21:02

## 2023-01-10 RX ADMIN — EPHEDRINE SULFATE 15 MG: 50 INJECTION INTRAVENOUS at 15:31

## 2023-01-10 RX ADMIN — GLYCOPYRROLATE 0.4 MG: 0.2 INJECTION INTRAMUSCULAR; INTRAVENOUS at 15:55

## 2023-01-10 RX ADMIN — SCOPALAMINE 1 PATCH: 1 PATCH, EXTENDED RELEASE TRANSDERMAL at 11:50

## 2023-01-10 RX ADMIN — SODIUM CHLORIDE 50 ML/HR: 9 INJECTION, SOLUTION INTRAVENOUS at 21:08

## 2023-01-10 RX ADMIN — LIDOCAINE HYDROCHLORIDE 0.5 ML: 10 INJECTION, SOLUTION EPIDURAL; INFILTRATION; INTRACAUDAL; PERINEURAL at 11:50

## 2023-01-10 RX ADMIN — PROPOFOL 25 MCG/KG/MIN: 10 INJECTION, EMULSION INTRAVENOUS at 13:58

## 2023-01-10 RX ADMIN — DEXAMETHASONE SODIUM PHOSPHATE 4 MG: 10 INJECTION, SOLUTION INTRAMUSCULAR; INTRAVENOUS at 13:49

## 2023-01-10 NOTE — ANESTHESIA POSTPROCEDURE EVALUATION
Patient: Lita Pisano    Procedure Summary     Date: 01/10/23 Room / Location:  [facility] /  [facility]    Anesthesia Start: 1342 Anesthesia Stop: 1640    Procedures:       EXCISION OF RIGHT BREAST IMPLANT capsule, LEFT PROPHYLACTIC MASTECTOMY (Right: Breast)      RIGHT BREAST RECONSTRUCTION, DELAYED WITH LEFT IMMEDIATE RECONSTRUCTION W/ TISSUE EXPANDER AND ALLODERM INSERTION (Breast) Diagnosis: History of right breast cancer    Surgeons: Sterling Yee MD; Emiliano Ackerman MD Provider: Manfred Morrow MD    Anesthesia Type: general with block ASA Status: 2          Anesthesia Type: general with block    Vitals  Vitals Value Taken Time   /71 01/10/23 1638   Temp     Pulse 70 01/10/23 1639   Resp     SpO2 98 % 01/10/23 1639   Vitals shown include unvalidated device data.        Post Anesthesia Care and Evaluation    Patient location during evaluation: PACU  Patient participation: complete - patient participated  Level of consciousness: sleepy but conscious  Pain management: adequate    Airway patency: patent  Anesthetic complications: No anesthetic complications  PONV Status: none  Cardiovascular status: hemodynamically stable and acceptable  Respiratory status: nonlabored ventilation, acceptable, nasal cannula and oral airway  Hydration status: acceptable

## 2023-01-10 NOTE — OP NOTE
Operative Note    Lita Pisano  [id number]   1970     Date of Surgery:  1/10/2023    Pre-Operative Diagnosis: Personal history of right breast malignancy    Post-Operative Diagnosis: Same    Procedure: Prophylactic left skin sparing mastectomy    Anesthesia:  General     This procedure was not performed to treat breast cancer through sentinel node biopsy       Surgeon:  Sterling Yee MD    Circulator: Reji Medina, TITO; Cassie Grady RN; Cheryle Waddell RN  Scrub Person: Waleska Castillo; Tosha Church RN  Nursing Assistant: Lissy Pisano  Assistant: Meenu Sung PA; Lamar Hernandez PA-C; Elizabeth Gallagher PA; Judit Bates PA     Assistant: Meenu Sung PA; Lamar Hernandez PA-C; Elizabeth Gallagher PA; Judit Bates PA    Estimated Blood Loss: Very minimal    Complications: None      Indication for Procedure: Mrs. Pisano is a pleasant 52-year-old lady who underwent a right skin sparing mastectomy and sentinel for biopsy in July 2022 secondary to extensive DCIS with very small focus of microinvasion of IDC noted.  Of note she has prepectoral breast implants.  The right capsule overlying the implant was left intact.  Final pathology showed a large focus of DCIS with concern about invasion of the posterior margin.  The patient presents today for prophylactic left skin sparing mastectomy as well as wide excision of the right implant capsule with plan to have immediate reconstruction by Dr. Ackerman.    Procedure: Patient was taken to the operating by anesthesia placed supine on the table.  Following induction of general endotracheal anesthesia, she received 2 g of Kefzol IV.  SCDs were placed.  A La catheter was anchored.  Anesthesia placed ultrasound-guided pectoralis nerve blocks bilaterally.  The breasts, chest, axillas and upper arms were then prepped and draped in sterile fashion.  Timeout was observed.  A transverse elliptical incision was made encompassing  the left areola and nipple complex.  A superior and inferior flaps were raised dissecting the breast tissue away from the flaps down to where the muscle was exposed, maintaining adequate thickness of the flaps.  The breast was then removed of the underlying pectoralis major muscle taking the fascia along with the specimen as well as the left implant capsule which was left intact.  The breast was removed as a whole, marked with a silk suture laterally, weighed and sent to pathology for permanent section.  The wound was then irrigated with warm water with clear return of fluid noted.  Dr. Ackerman proceeded by dissecting the right mastectomy flaps off of the underlying capsule and implant.  He then excised the capsule which was marked with a long silk suture laterally and a short suture superiorly with silk sutures on the anterior margin.  It was a generous specimen.  It was sent to pathology for permanent section.  He then proceeded with immediate reconstruction.  The patient was doing relatively well with anesthesia.  Punch count needle count were correct at the end of my procedure.    Assistant: Meenu Sung PA; Lamar Hernandez PA-C; Elizabeth Gallagher PA; Judit Bates PA  was responsible for performing the following activities: Retraction and Suction and their skilled assistance was necessary for the success of this case.      Sterling Yee MD  01/10/23  17:50 EST

## 2023-01-10 NOTE — BRIEF OP NOTE
BREAST MASTECTOMY  Progress Note    Lita Pisano  1/10/2023    Pre-op Diagnosis:   Personal history of right breast cancer       Post-Op Diagnosis Codes:     * History of right breast cancer [Z85.3]    Procedure/CPT® Codes:        Procedure(s):  EXCISION OF RIGHT BREAST IMPLANT capsule, LEFT PROPHYLACTIC MASTECTOMY  RIGHT BREAST RECONSTRUCTION, DELAYED WITH LEFT IMMEDIATE RECONSTRUCTION W/ TISSUE EXPANDER AND ALLODERM INSERTION        Surgeon(s):  Sterling Yee MD Lynch, Michael Paul, MD Howell, Christopher R, MD    Anesthesia: General    Staff:   Circulator: Reji Medina RN; Cassie Grady RN; Cheryle Waddell RN  Scrub Person: Waleska Castillo; Tosha Church RN  Nursing Assistant: Lissy Pisaon  Assistant: Lamar Hernandez PA-C; Elizabeth Gallagher PA  Assistant: Lamar Hernandez PA-C; Elizabeth Gallagher PA      Estimated Blood Loss: minimal    Urine Voided: * No values recorded between 1/10/2023  1:42 PM and 1/10/2023  3:17 PM *    Specimens:                Specimens     ID Source Type Tests Collected By Collected At Frozen?    A Breast, Right Tissue · TISSUE PATHOLOGY EXAM   Sterling Yee MD 1/10/23 2834     Description: Capsule    Comment: Long Suture Lateral   Short Suture Supirior  Other Silks Anterior    This specimen was not marked as sent.    B Breast, Left Tissue · TISSUE PATHOLOGY EXAM   Sterling Yee MD 1/10/23 8057     Description: silk suture lateral     This specimen was not marked as sent.                Drains:   [REMOVED] Closed/Suction Drain 1 Right Breast Bulb 15 Fr. (Removed)       Complications: None    Assistant: Lamar Hernandez PA-C; Elizabeth Gallagher PA  was responsible for performing the following activities: Retraction and Suction and their skilled assistance was necessary for the success of this case.    Sterling Yee MD     Date: 1/10/2023  Time: 15:17 EST

## 2023-01-10 NOTE — BRIEF OP NOTE
BREAST RECONSTRUCTION DELAYED WITH TISSUE EXPANDER INSERTION  Progress Note    Angela Norris  1/10/2023    Pre-op Diagnosis:   1. Bilateral absent breasts       Post-Op Diagnosis Codes:     * History of right breast cancer [Z85.3]    Procedure/CPT® Codes:        Procedure(s):  EXCISION OF RIGHT BREAST IMPLANT capsule, LEFT PROPHYLACTIC MASTECTOMY  RIGHT BREAST RECONSTRUCTION, DELAYED WITH LEFT IMMEDIATE RECONSTRUCTION W/ TISSUE EXPANDER AND ALLODERM INSERTION        Surgeon(s):  Sterling Yee MD Lynch, Michael Paul, MD Howell, Christopher R, MD    Anesthesia: General    Staff:   Circulator: Reji Medina, TITO; Cassie Grady RN; Cheryle Waddell RN  Scrub Person: Waleska Castillo; Tosha Church RN  Nursing Assistant: Lissy Pisano  Assistant: Meenu Sung PA; Lamar Hernandez PA-C; Elizabeth Gallagher PA; Judit Bates PA  Assistant: Meenu Sung PA; Lamar Hernandez PA-C; Elizabeth Gallagher PA; Judit Bates PA      Estimated Blood Loss: 100ml    Urine Voided: * No values recorded between 1/10/2023  1:42 PM and 1/10/2023  4:09 PM *    Specimens:                Specimens     ID Source Type Tests Collected By Collected At Frozen?    A Breast, Right Tissue · TISSUE PATHOLOGY EXAM   Sterling Yee MD 1/10/23 5623     Description: Capsule    Comment: Long Suture Lateral   Short Suture Supirior  Other Silks Anterior    This specimen was not marked as sent.    B Breast, Left Tissue · TISSUE PATHOLOGY EXAM   Sterling Yee MD 1/10/23 6686     Description: silk suture lateral     This specimen was not marked as sent.    C Breast, Left Tissue · TISSUE PATHOLOGY EXAM   Sterling Yee MD 1/10/23 4777     Description: left breast tissue capsule    This specimen was not marked as sent.                Drains:   Closed/Suction Drain 1 Inferior;Lateral;Right Breast Bulb 15 Fr. (Active)       Closed/Suction Drain 1 Inferior;Lateral;Left Breast Bulb 15 Fr. (Active)        [REMOVED] Closed/Suction Drain 1 Right Breast Bulb 15 Fr. (Removed)       Findings: see op note        Complications: none immediate     Assistant: Meenu Sung PA; Lamar Hernandez PA-C; Elizabeth Gallagher PA; Judit Bates PA  was responsible for performing the following activities: Suturing and their skilled assistance was necessary for the success of this case.    Emiliano Ackerman MD     Date: 1/10/2023  Time: 16:14 EST

## 2023-01-10 NOTE — H&P
Pre-Op H&P  Lita Pisano  [id number]  1970      Chief complaint: Breast cancer      Subjective:  Patient is a 52 y.o.female presents for scheduled surgery by Dr. FULLER. She anticipates a EXCISION OF RIGHT BREAST IMPLANT capsule, LEFT PROPHYLACTIC MASTECTOMY; RIGHT BREAST RECONSTRUCTION, DELAYED WITH LEFT IMMEDIATE RECONSTRUCTION W/ TISSUE EXPANDER AND ALLODERM INSERTION today. 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 On 6/28/22 she underwent right breast skin sparing mastectomy with sentinel node bx; which she recovered well.      Review of Systems:  Constitutional-- No fever, chills or sweats. No fatigue.  CV-- No chest pain, palpitation or syncope. +HTN, HLD  Resp-- No SOB, cough, hemoptysis  Skin--No rashes or lesions      Allergies:   Allergies   Allergen Reactions   • Sulfa Antibiotics Rash         Home Meds:  Medications Prior to Admission   Medication Sig Dispense Refill Last Dose   • atorvastatin (LIPITOR) 10 MG tablet Take 10 mg by mouth Daily.   1/9/2023   • Bystolic 20 MG tablet Take 10 mg by mouth Every Morning.   1/10/2023   • cetirizine (zyrTEC) 10 MG tablet Take 10 mg by mouth Daily.   1/9/2023   • estradiol (MINIVELLE, VIVELLE-DOT) 0.05 MG/24HR patch Place 1 patch on the skin as directed by provider 2 (Two) Times a Week.   1/9/2023   • hydroCHLOROthiazide (MICROZIDE) 12.5 MG capsule Take 12.5 mg by mouth Every Other Day.   1/9/2023   • Levomilnacipran HCl ER (FETZIMA) 40 MG capsule sustained-release 24 hr Take 40 mg by mouth Daily. 90 capsule 3 1/9/2023   • Melatonin 10 MG tablet Take 10 mg by mouth Every Night.   Past Week   • montelukast (SINGULAIR) 10 MG tablet Take 10 mg by mouth Every Night.   1/9/2023   • Multiple Vitamins-Minerals (MULTIVITAMIN WITH MINERALS) tablet tablet Take 1 tablet by mouth Daily.   1/9/2023   • vitamin E 400 Units capsule     "2023   • Barberry-Oreg Grape-Goldenseal (BERBERINE COMPLEX PO)       • levonorgestrel (MIRENA) 20 MCG/24HR IUD 1 each by Intrauterine route 1 (One) Time.            PMH:   Past Medical History:   Diagnosis Date   • Anxiety    • Breast cancer (HCC) 2022    Right   • Depression    • Hyperlipidemia    • Hypertension      PSH:    Past Surgical History:   Procedure Laterality Date   • AUGMENTATION MAMMAPLASTY      saline, retro   • BREAST AUGMENTATION Bilateral 2019    saline removed and replaced with Gel Dr Kaye   • CHOLECYSTECTOMY     • DENTAL PROCEDURE      Dental implant   • MASTECTOMY W/ SENTINEL NODE BIOPSY Right 2022    Procedure: BREAST SKIN SPARING  MASTECTOMY WITH SENTINEL NODE BIOPSY- RIGHT;  Surgeon: Sterling Yee MD;  Location: [facility];  Service: General;  Laterality: Right;       Immunization History:  Influenza:   Pneumococcal: No  Tetanus: Unknown  Covid x2:     Social History:   Tobacco:   Social History     Tobacco Use   Smoking Status Former   • Packs/day: 0.50   • Years: 32.00   • Pack years: 16.00   • Types: Cigarettes   • Quit date: 2022   • Years since quittin.5   Smokeless Tobacco Never      Alcohol:     Social History     Substance and Sexual Activity   Alcohol Use Yes   • Alcohol/week: 1.0 standard drink   • Types: 1 Glasses of wine per week         Physical Exam:/80 (BP Location: Right arm, Patient Position: Lying)   Pulse 72   Temp 97.6 °F (36.4 °C) (Temporal)   Resp 16   Ht 177.8 cm (70\")   Wt 80.7 kg (178 lb)   SpO2 99%   BMI 25.54 kg/m²       General Appearance:    Alert, cooperative, no distress, appears stated age   Head:    Normocephalic, without obvious abnormality, atraumatic   Lungs:     Clear to auscultation bilaterally, respirations unlabored    Heart:   Regular rate and rhythm, S1 and S2 normal    Abdomen:    Soft without tenderness   Extremities:   Extremities normal, atraumatic, no cyanosis or edema   Skin:   Skin " color, texture, turgor normal, no rashes or lesions   Neurologic:   Grossly intact     Results Review:     LABS:  Lab Results   Component Value Date    WBC 7.18 12/30/2022    HGB 13.2 12/30/2022    HCT 38.3 12/30/2022    MCV 91.8 12/30/2022     12/30/2022    NEUTROABS 5.26 09/03/2019    GLUCOSE 87 12/30/2022    BUN 15 12/30/2022    CREATININE 0.63 12/30/2022    EGFRIFNONA 85 05/14/2020     12/30/2022    K 3.6 12/30/2022     12/30/2022    CO2 29.0 12/30/2022    CALCIUM 9.4 12/30/2022    ALBUMIN 5.0 12/30/2022    AST 16 12/30/2022    ALT 16 12/30/2022    BILITOT 0.3 12/30/2022     Clinical Information    Malignant neoplasm of upper-outer quadrant of right female breast (HCC)   Final Diagnosis   1.  LYMPH NODES, RIGHT SENTINEL, EXCISION:  Two lymph nodes negative for metastatic carcinoma (0/2).    2.  RIGHT BREAST, SIMPLE MASTECTOMY:  Multifocal microinvasive carcinoma arising in the setting of extensive high-grade ductal carcinoma in situ.  Ductal carcinoma in situ extends to the posterior margin of the specimen.  Surgical margins negative for invasive carcinoma.  See comment tumor synoptic for additional details.     INVASIVE BREAST CANCER TEMPLATE:  TYPE OF SPECIMEN/PROCEDURE: Simple mastectomy  SPECIMEN LATERALITY: Right  TUMOR SITE: Subareolar  TUMOR SIZE: (Size of Largest Invasive Carcinoma): Multifocal microinvasive (less than 1 mm) tumor  HISTOLOGIC TYPE OF INVASIVE CARCINOMA: Microinvasive carcinoma  GRADE: N/A  TUBULAR FORMATION: N/A  MITOTIC ACTIVITY: N/A  PLEOMORPHISM: N/A  OVERALL SCORE: N/A  DUCTAL CARCINOMA IN SITU (DCIS) EXTENT: Present, high-grade, extensive, 3 cm span  TUMOR EXTENSION (Only if structures present and involved):                SKIN: Uninvolved               NIPPLE: Uninvolved               SKELETAL MUSCLE: N/A  SURGICAL MARGINS (Uninvolved/positive): Involved by ductal carcinoma in situ  INVASIVE CARCINOMA MARGINS (Uninvolved/Positive): Uninvolved  DISTANCE FROM  CLOSEST MARGIN: 1.5 mm  SPECIFIC CLOSEST MARGIN: Deep  DCIS MARGINS (Uninvolved/Positive/No DCIS): Positive  DISTANCE OF DCIS FROM CLOSEST MARGIN: 0 mm  SPECIFIC CLOSEST MARGIN TO DCIS: Deep  LYMPH NODES (required only if lymph nodes are present in the specimen): No tumor and submitted sentinel nodes  NUMBER OF LYMPH NODES WITH MACROMETASTASIS: 0  NUMBER OF LYMPH NODES WITH MICROMETASTASIS: 0  NUMBER OF LYMPH NODES WITH ISOLATED TUMOR CELLS: 0  SIZE OF LARGEST METASTATIC DEPOSIT (MM): N/A  TOTAL NUMBER OF LYMPH NODES EXAMINED (Including sentinel nodes): 2  NUMBER OF SENTINEL NODES EXAMINED: 2  EXTRANODAL EXTENSION OF TUMOR: N/A  VASCULAR/LYMPHATIC INVASION: Not identified  ESTROGEN RECEPTOR STATUS BY IHC METHOD: Negative in the in situ carcinoma per previous biopsy  PROGESTERONE RECEPTOR STATUS BY IHC METHOD: Negative in the in situ carcinoma per previous biopsy   HER-2/ilda ONCOPROTEIN STATUS BY IHC METHOD: Not performed  HER-2/ilda ONCOGENE STATUS BY IN SITU HYBRIDIZATION ANALYSIS: Not performed  TREATMENT EFFECT (BREAST): No known presurgical therapy  TREATMENT EFFECT (NODES): No known presurgical therapy  ADDITIONAL PATHOLOGIC FINDINGS: None  OTHER STUDIES: Immunohistochemical staining  AJCC PATHOLOGIC STAGE:  (COMPLETED BY PATHOLOGIST, BASED ONLY ON TISSUE FINDINGS, MORE EXTENSIVE DISEASE MAY NOT BE KNOWN TO THE PATHOLOGIST)  pT=  1mi(m)  pN=  0(sn)       RADIOLOGY:  Imaging Results (Last 72 Hours)     ** No results found for the last 72 hours. **          I reviewed the patient's new clinical results.    Cancer Staging (if applicable)  Cancer Patient: _x_ yes __no __unknown;      Impression: Stage Imi (T1mi, N0, M0) DCIS and IDC right breast; ER/MN-      Plan: EXCISION OF RIGHT BREAST IMPLANT capsule, LEFT PROPHYLACTIC MASTECTOMY; RIGHT BREAST RECONSTRUCTION, DELAYED WITH LEFT IMMEDIATE RECONSTRUCTION W/ TISSUE EXPANDER AND ALLODERM INSERTION      Tessie Obregon, APRN   1/10/2023   12:04 EST

## 2023-01-10 NOTE — PROGRESS NOTES
"Patient Name:  Lita Pisano  YOB: 1970  [id number]    Surgery Post - Operative Note    Date of visit: 1/10/2023    Subjective   Subjective: Feels OK, pain controlled.       Objective     Objective:    /74   Pulse 91   Temp 97.2 °F (36.2 °C)   Resp 16   Ht 177.8 cm (70\")   Wt 80.7 kg (178 lb)   SpO2 98%   BMI 25.54 kg/m²     CV:  Rate  regular and rhythm  regular  L:  Clear  to auscultation bilaterally   CHEST: Dressings c/d/i, TELMA's serosanguinous  ABD:  Soft, nontender  EXT:  No cyanosis, clubbing or edema             Assessment/ Plan: Doing well after R mastectomy. Continue Pulmonary toilet    Problem List Items Addressed This Visit        Hematology and Neoplasia    * (Principal) Breast cancer (HCC)    Relevant Orders    Tissue Pathology Exam        Active Hospital Problems    Diagnosis  POA   • **Breast cancer (HCC) [C50.919]  Yes      Resolved Hospital Problems   No resolved problems to display.            David Hall MD  1/10/2023  17:39 EST    "

## 2023-01-10 NOTE — ANESTHESIA PREPROCEDURE EVALUATION
Anesthesia Evaluation     Patient summary reviewed and Nursing notes reviewed   NPO Solid Status: > 8 hours  NPO Liquid Status: > 2 hours           Airway   Mallampati: II  TM distance: >3 FB  Neck ROM: full  No difficulty expected  Dental      Pulmonary     breath sounds clear to auscultation  Cardiovascular     ECG reviewed  Rhythm: regular  Rate: normal    (+) hypertension, hyperlipidemia,       Neuro/Psych  GI/Hepatic/Renal/Endo      Musculoskeletal     Abdominal    Substance History      OB/GYN          Other                        Anesthesia Plan    ASA 2     general with block     intravenous induction     Anesthetic plan, risks, benefits, and alternatives have been provided, discussed and informed consent has been obtained with: patient.    Plan discussed with CRNA.        CODE STATUS:

## 2023-01-10 NOTE — OP NOTE
DATE OF PROCEDURE:  01/10/23    PREOPERATIVE DIAGNOSIS: Bilateral absent breasts, status post bilateral mastectomies for right breast cancer.     POSTOPERATIVE DIAGNOSIS: Bilateral absent breasts, status post bilateral mastectomies for right breast cancer.     PROCEDURES PERFORMED:  1. Bilateral breast reconstruction with immediate insertion of tissue expanders.    2. Use of AlloDerm in breast reconstruction bilaterally.     SURGEON: Emiliano Ackerman MD    ASSISTANT: Meenu Sung PA-C    Assistant: Meenu Sung PA; Lamar Hernandez PA-C; Elizabeth Gallagher PA; Judit Bates PA  was responsible for performing the following activities: Suturing and their skilled assistance was necessary for the success of this case.    Circulator: Reji Medina RN; Cassie Grady RN; Cheryle Waddell RN  Scrub Person: Waleska Castillo; Tosha Church RN  Nursing Assistant: Lissy Pisano  Assistant: Meenu Sung PA; Lamar Hernandez PA-C; Elizabeth Gallagher PA; Judit Bates PA     ANESTHESIA: General.    INDICATIONS: The patient is a 52-year-old female who was diagnosed with right-sided breast cancer and referred to me by Dr. FULLER. The patient was referred for breast reconstruction. We had a long discussion about immediate breast reconstruction with tissue expanders and she understood all of the risks and benefits of the procedure including infection, need for expander removal, need for future procedures and elected to proceed. She had already had a right mastectomy but her old silicone implant was still in place. She had developed capsular contracture and desired reconstruction on that side as well as the left.     FINDINGS:  1. Placement of Allergan 600 cc expanders.   2. Placement of 250 mL of 600 in each expander today.    DESCRIPTION OF PROCEDURE: The patient was seen in preoperative holding and marked in a standing position and taken to the operating room by anesthesia after informed consent was  signed and on the chart. The patient was placed supine on the operating room table and general anesthesia was induced. The patient underwent bilateral pectoralis muscle nerve blocks, and the case was then turned over to the surgical service. The patient was then prepped and draped in a normal sterile fashion. Timeout was called and all parties were in agreement including nursing and anesthesia. Preoperative antibiotics were given. We began with Dr. FULLER's portion of the case. Please see his dictation for his portion of the case. Following the bilateral mastectomies, the plastic surgery portion of the case began. We began by redraping and prepping the patient. Another timeout was called. After agreement, we began on the left noncancerous side by copious antibiotic irrigation and then sewing a large sheet of AlloDerm in a prepectoral reconstructive fashion. The AlloDerm was sutured on top of the pectoralis muscle with a 2-0 Vicryl in multiple different places. The chest wall was measured to be 14 cm in width, which corresponded to a 600 cc expander. The tissue expander was opened and all of the air was removed, soaked in antibiotic irrigation and then placed on top of the pectoralis muscle, but underneath the large sheet of AlloDerm. It was then sutured into place with a 2-0 silk suture in multiple suture tab positions. Then, the expander was then insufflated to 250 mL of saline, and the AlloDerm was then tacked on top of that to complete coverage of the tissue expander with the large sheet of AlloDerm. The excess AlloDerm was then trimmed, and the remaining aspects of it were sutured down to the chest wall on top of the pectoralis muscle. The one 15-Danish round Joe drains were placed in the pocket. We then removed to the right side and performed an identical reconstruction with a prepectoral tissue expander and AlloDerm, 1 drain. The skin was then closed identically on both sides with 3-0 Monocryl in Nayana’s  fascia, then 3-0 Monocryl in a buried deep dermal fashion, and a 4-0 Monocryl in a running subcuticular fashion. Pravena was placed over the incisions. The patient was awoken in stable condition and taken to PACU.  All sponge and instrument counts were correct. I, Dr. Emiliano Ackerman, was present and scrubbed for the entire procedure.     SPECIMENS: None.     ESTIMATED BLOOD LOSS: 50 cc    DRAINS: TELMA drains x 2.       COMPLICATIONS: None immediate.     Emiliano Ackerman MD  01/10/23  16:14 EST

## 2023-01-10 NOTE — ANESTHESIA PROCEDURE NOTES
Airway  Urgency: elective    Date/Time: 1/10/2023 1:50 PM  Airway not difficult    General Information and Staff    Patient location during procedure: OR  CRNA/CAA: Bertha Quispe CRNA    Indications and Patient Condition  Indications for airway management: airway protection    Preoxygenated: yes  MILS not maintained throughout  Mask difficulty assessment: 1 - vent by mask    Final Airway Details  Final airway type: endotracheal airway      Successful airway: ETT  Cuffed: yes   Successful intubation technique: direct laryngoscopy  Facilitating devices/methods: intubating stylet  Endotracheal tube insertion site: oral  Blade: Vijay  Blade size: 3  ETT size (mm): 7.0  Cormack-Lehane Classification: grade I - full view of glottis  Placement verified by: chest auscultation and capnometry   Measured from: lips  ETT/EBT  to lips (cm): 20  Number of attempts at approach: 1  Assessment: lips, teeth, and gum same as pre-op and atraumatic intubation    Additional Comments  Negative epigastric sounds, Breath sound equal bilaterally with symmetric chest rise and fall. Teeth intact, atraumatic

## 2023-01-10 NOTE — ANESTHESIA PROCEDURE NOTES
B) PECs 1 & 2      Patient reassessed immediately prior to procedure    Patient location during procedure: OR  Reason for block: at surgeon's request and post-op pain management  Performed by  CRNA/CAA: Henna Nj CRNA  Preanesthetic Checklist  Completed: patient identified, IV checked, site marked, risks and benefits discussed, surgical consent, monitors and equipment checked, pre-op evaluation and timeout performed  Prep:  Pt Position: supine  Sterile barriers:cap, gloves, mask and washed/disinfected hands  Prep: ChloraPrep  Patient monitoring: blood pressure monitoring, continuous pulse oximetry and EKG  Procedure  Performed under: general  Guidance:ultrasound guided and landmark technique  Images:still images obtained, printed/placed on chart    Laterality:Bilateral  Block Type:PECS I and PECS II  Injection Technique:single-shot  Needle Type:short-bevel  Needle Gauge:20 G  Resistance on Injection: none    Medications Used: buprenorphine (BUPRENEX) injection - Injection   0.3 mg - 1/10/2023 1:49:00 PM  dexamethasone sodium phosphate injection - Injection   4 mg - 1/10/2023 1:49:00 PM  bupivacaine PF (MARCAINE) 0.25 % injection - Injection   60 mL - 1/10/2023 1:49:00 PM      Medications  Preservative Free Saline:10ml  Comment:Block Injection:  Total volume of LA divided between Right and Left sided blocks         Post Assessment  Injection Assessment: negative aspiration for heme, incremental injection and no paresthesia on injection  Patient Tolerance:comfortable throughout block  Complications:no  Additional Notes  Interpectoral-Pectoserratus Plane   A high-frequency linear transducer, with sterile cover, was placed medial to the coracoid process in the paramedian sagittal plane. The transducer was moved caudally to the 4th rib and rotated slightly to allow an in-plane needle trajectory from medial to lateral. Pectoralis Major Muscle (PMM), Pectoralis Minor Muscle (PmM), Thoracoacromial Artery, Ribs, and  Pleura were identified under ultrasound. The insertion site was prepped in sterile fashion and then localized with 2-5 ml of 1% Lidocaine. Using ultrasound-guidance, a 20-gauge B-Garces 4\" Ultraplex 360 non-stimulating echogenic needle was advanced in plane until the tip of the needle was in the fascial plane between the PMM and PmM, lateral to the Thoracoacromial Artery. 1-3ml of preservative free normal saline was used to hydro-dissect the fascial planes. After the fascial plane was verified, 10ml local anesthetic (LA) was injected for Interpectoral fascial plane block. The needle was continued along the same path to the level of the 4th rib below PmM.  Initially preservative free normal saline was used to confirm needle position and then 20 ml of LA was injected for Pectoserratus fascial plane block. Aspiration every 5 ml to prevent intravascular injection. Injection was completed with negative aspiration of blood and negative intravascular injection. Injection pressures were normal with minimal resistance.

## 2023-01-11 VITALS
OXYGEN SATURATION: 96 % | HEIGHT: 70 IN | DIASTOLIC BLOOD PRESSURE: 60 MMHG | WEIGHT: 178 LBS | SYSTOLIC BLOOD PRESSURE: 100 MMHG | RESPIRATION RATE: 18 BRPM | BODY MASS INDEX: 25.48 KG/M2 | HEART RATE: 67 BPM | TEMPERATURE: 98.3 F

## 2023-01-11 PROCEDURE — 25010000002 CEFAZOLIN IN DEXTROSE 2-4 GM/100ML-% SOLUTION: Performed by: SURGERY

## 2023-01-11 RX ADMIN — ACETAMINOPHEN 1000 MG: 500 TABLET, FILM COATED ORAL at 02:21

## 2023-01-11 RX ADMIN — ACETAMINOPHEN 1000 MG: 500 TABLET, FILM COATED ORAL at 07:59

## 2023-01-11 RX ADMIN — CEFAZOLIN SODIUM 2 G: 2 INJECTION, SOLUTION INTRAVENOUS at 06:01

## 2023-01-11 RX ADMIN — OXYCODONE HYDROCHLORIDE 5 MG: 5 TABLET ORAL at 02:21

## 2023-01-11 NOTE — PROGRESS NOTES
"Lita Pisnao  1970  [id number]    Surgery Progress Note    Date of visit: 1/11/2023    Subjective: Comfortable  Ambulating  Minimal narcotics    Objective:    /60   Pulse 67   Temp 98.3 °F (36.8 °C) (Temporal)   Resp 18   Ht 177.8 cm (70\")   Wt 80.7 kg (178 lb)   SpO2 96%   BMI 25.54 kg/m²     Intake/Output Summary (Last 24 hours) at 1/11/2023 0844  Last data filed at 1/11/2023 0700  Gross per 24 hour   Intake 2380 ml   Output 2050 ml   Net 330 ml       CV: Regular rate and rhythm  L: normal air entry  BREAST: Flaps are viable  Adequate Deangelo-Cesar drainage      LABS:    Results from last 7 days   Lab Units 01/10/23  1156   HEMOGLOBIN, POC g/dL 10.5*   HEMATOCRIT POC % 31*           Invalid input(s): LABALBU, PROT      No results found for: LIPASE      Assessment/ Plan: Stable course status post prophylactic left skin sparing mastectomy with bilateral immediate reconstruction  Patient is progressing well  Instructions were given to her  Tylenol 1000 mg p.o. 3 times daily for 4 days  She already has prescriptions from Dr. Ackerman at home  Home today  Follow-up in the cancer clinic in 1 week    Problem List Items Addressed This Visit        Hematology and Neoplasia    * (Principal) Breast cancer (HCC)    Relevant Orders    Tissue Pathology Exam         Sterling Yee MD  1/11/2023  08:44 EST    "

## 2023-01-11 NOTE — PLAN OF CARE
Goal Outcome Evaluation:  Plan of Care Reviewed With: patient, spouse        Progress: improving  Outcome Evaluation: Pt has had minimal pain which was alleviated with prn and Tylenol; pleasant;  at bedside throughout night; no concerns; will continue to monitor

## 2023-01-11 NOTE — PROGRESS NOTES
LOS: 0 days   Patient Care Team:  Rika Monique APRN as PCP - General (Family Medicine)    Chief Complaint:  Doing well. Pain controlled.     Subjective     Interval History:     Patient Complaints: none  Patient Denies:  Significant pain   History taken from: patient        Objective     Vital Signs  Temp:  [97.2 °F (36.2 °C)-98.2 °F (36.8 °C)] 97.9 °F (36.6 °C)  Heart Rate:  [65-91] 80  Resp:  [14-20] 20  BP: ()/(57-84) 95/57    Physical Exam:  GEN: NAD, AAOx3   Breast: skin flaps pink, viable. No hematomas. Incisions c/d/i. TELMA drains in place with serosanginous drainage. Pravena holding seal        Results Review:     I reviewed the patient's new clinical results.  Lab Results   Component Value Date    WBC 7.18 12/30/2022    HGB 10.5 (L) 01/10/2023    HCT 31 (L) 01/10/2023    MCV 91.8 12/30/2022     12/30/2022     Lab Results   Component Value Date    GLUCOSE 87 12/30/2022    CALCIUM 9.4 12/30/2022     12/30/2022    K 3.6 12/30/2022    CO2 29.0 12/30/2022     12/30/2022    BUN 15 12/30/2022    CREATININE 0.63 12/30/2022    EGFRIFNONA 85 05/14/2020    BCR 23.8 12/30/2022    ANIONGAP 11.0 12/30/2022   No results found for: PTTNo results found for: INR, PROTIME      Assessment & Plan       Breast cancer (HCC)      1. D/c home  2. F/u in 1 week in office  3. Leave dressing on until Monday. Can then take off and shower after office visit   4. Drain care  5. Scripts on chart  6. No lifting    Plan for disposition: home    Emiliano Ackerman MD  01/11/23  07:11 EST

## 2023-01-11 NOTE — PLAN OF CARE
Goal Outcome Evaluation:  Plan of Care Reviewed With: patient, significant other        Progress: improving  Outcome Evaluation: Pt has had minimal pain which was alleviated with prn and Tylenol; pleasant;  at bedside throughout night; no concerns; will continue to monitor

## 2023-01-11 NOTE — PLAN OF CARE
Goal Outcome Evaluation:            Pt d/c at 1100. D/c information complete and questions answered. IV removed.

## 2023-01-11 NOTE — PLAN OF CARE
Goal Outcome Evaluation:      Pt brought to the floor at 1820 following Bilat breast reconstruction w/ insertion of expanders, excision of R breat implant capsule, and L prophylactic mastectomy. VSS, alert and orientated, no complaints of pain. Pt has bilateral TELMA drains and bilateral preveno wound vacs. Admission education completed. Will continue to monitor.

## 2023-01-11 NOTE — CASE MANAGEMENT/SOCIAL WORK
Continued Stay Note  [facility]     Patient Name: Lita Pisano  MRN: [id number]  Today's Date: 1/11/2023    Admit Date: 1/10/2023    Plan: Home   Discharge Plan     Row Name 01/11/23 0905       Plan    Plan Home    Plan Comments Anticipate discharge home today.  Sirisha Quintero, Ext. [phone]    Final Discharge Disposition Code 01 - home or self-care               Discharge Codes    No documentation.               Expected Discharge Date and Time     Expected Discharge Date Expected Discharge Time    Jan 11, 2023             NABILA Jackman

## 2023-01-12 LAB
CYTO UR: NORMAL
LAB AP CASE REPORT: NORMAL
LAB AP CLINICAL INFORMATION: NORMAL
PATH REPORT.FINAL DX SPEC: NORMAL
PATH REPORT.GROSS SPEC: NORMAL

## 2023-03-14 ENCOUNTER — TRANSCRIBE ORDERS (OUTPATIENT)
Dept: ADMINISTRATIVE | Facility: HOSPITAL | Age: 53
End: 2023-03-14
Payer: COMMERCIAL

## 2023-03-14 DIAGNOSIS — F17.200 ENCOUNTER FOR SCREENING FOR MALIGNANT NEOPLASM OF LUNG IN CURRENT SMOKER WITH 30 PACK YEAR HISTORY OR GREATER: Primary | ICD-10-CM

## 2023-03-14 DIAGNOSIS — Z12.2 ENCOUNTER FOR SCREENING FOR MALIGNANT NEOPLASM OF LUNG IN CURRENT SMOKER WITH 30 PACK YEAR HISTORY OR GREATER: Primary | ICD-10-CM

## 2023-03-20 ENCOUNTER — HOSPITAL ENCOUNTER (OUTPATIENT)
Dept: CT IMAGING | Facility: HOSPITAL | Age: 53
Discharge: HOME | End: 2023-03-20
Admitting: NURSE PRACTITIONER
Payer: COMMERCIAL

## 2023-03-20 DIAGNOSIS — F17.200 ENCOUNTER FOR SCREENING FOR MALIGNANT NEOPLASM OF LUNG IN CURRENT SMOKER WITH 30 PACK YEAR HISTORY OR GREATER: ICD-10-CM

## 2023-03-20 DIAGNOSIS — Z12.2 ENCOUNTER FOR SCREENING FOR MALIGNANT NEOPLASM OF LUNG IN CURRENT SMOKER WITH 30 PACK YEAR HISTORY OR GREATER: ICD-10-CM

## 2023-03-20 PROCEDURE — 71271 CT THORAX LUNG CANCER SCR C-: CPT

## 2023-05-03 ENCOUNTER — OFFICE VISIT (OUTPATIENT)
Dept: OBSTETRICS AND GYNECOLOGY | Facility: CLINIC | Age: 53
End: 2023-05-03
Payer: COMMERCIAL

## 2023-05-03 VITALS — DIASTOLIC BLOOD PRESSURE: 88 MMHG | SYSTOLIC BLOOD PRESSURE: 134 MMHG | WEIGHT: 182 LBS | BODY MASS INDEX: 26.11 KG/M2

## 2023-05-03 DIAGNOSIS — Z12.11 SCREENING FOR COLON CANCER: ICD-10-CM

## 2023-05-03 DIAGNOSIS — C50.411 MALIGNANT NEOPLASM OF UPPER-OUTER QUADRANT OF RIGHT FEMALE BREAST, UNSPECIFIED ESTROGEN RECEPTOR STATUS: ICD-10-CM

## 2023-05-03 DIAGNOSIS — R23.2 HOT FLASHES: ICD-10-CM

## 2023-05-03 DIAGNOSIS — Z01.419 PAP TEST, AS PART OF ROUTINE GYNECOLOGICAL EXAMINATION: ICD-10-CM

## 2023-05-03 DIAGNOSIS — Z01.419 WOMEN'S ANNUAL ROUTINE GYNECOLOGICAL EXAMINATION: ICD-10-CM

## 2023-05-03 DIAGNOSIS — Z30.431 IUD CHECK UP: ICD-10-CM

## 2023-05-03 DIAGNOSIS — R87.610 ATYPICAL SQUAMOUS CELL CHANGES OF UNDETERMINED SIGNIFICANCE (ASCUS) ON CERVICAL CYTOLOGY WITH NEGATIVE HIGH RISK HUMAN PAPILLOMA VIRUS (HPV) TEST RESULT: Primary | ICD-10-CM

## 2023-05-03 PROBLEM — D05.11 DUCTAL CARCINOMA IN SITU (DCIS) OF RIGHT BREAST: Status: ACTIVE | Noted: 2023-05-03

## 2023-05-03 RX ORDER — VENLAFAXINE HYDROCHLORIDE 75 MG/1
75 CAPSULE, EXTENDED RELEASE ORAL DAILY
Qty: 90 CAPSULE | Refills: 4 | Status: SHIPPED | OUTPATIENT
Start: 2023-05-03

## 2023-05-03 NOTE — PROGRESS NOTES
Gynecologic Annual Exam Note          GYN Annual Exam     Gynecologic Exam        Subjective     HPI  Lita Pisano is a 52 y.o. female, , who presents for annual well woman exam as a established patient . No LMP recorded. Patient has had an implant..  Patient reports problems with: none.  Her periods are absent secondary to birth control.. She reports dysmenorrhea is none. Partner Status: Marital Status: . She is is sexually active. She has not had new partners.. STD testing recommendations have been explained to the patient and she does not desire STD testing. Since her last visit the patient was diagnosed with right breast cancer in 2022 with a mastectomy. left mastectomy prophylactic and breast reconstruction with expanders ini 2023.       Additional OB/GYN History   Current contraception: contraceptive methods: IUD.  Insertion date:  and estrodial patch  Desires to: continue contraception, patient tested negative for hormone receptors.     Last Pap : 22. Result: ASCUS. HPV: negative.   Last Completed Pap Smear          Ordered - PAP SMEAR (Every 3 Years) Ordered on 5/3/2023    2022  SCANNED - PAP SMEAR    2021  SCANNED - PAP SMEAR    2018  Pap IG, HPV-hr    2015  SCANNED - PAP SMEAR              History of abnormal Pap smear: yes -  ASCUS and  ASCUS   Family history of uterine, colon, breast, or ovarian cancer: no  Performs monthly Self-Breast Exam: n/a  Last mammogram: 2022. Done at [facility].    Last Completed Mammogram     This patient has no relevant Health Maintenance data.          History of abnormal mammogram: yes -  right ductal carcinoma in situ     Colonoscopy: has never had a colonoscopy.  Exercises Regularly: no  Feelings of Anxiety or Depression: no  Tobacco Usage?: No       Current Outpatient Medications:   •  atorvastatin (LIPITOR) 10 MG tablet, Take 1 tablet by mouth Daily., Disp: , Rfl:   •   Barberry-Oreg Grape-Goldenseal (BERBERINE COMPLEX PO), , Disp: , Rfl:   •  Bystolic 20 MG tablet, Take 10 mg by mouth Every Morning., Disp: , Rfl:   •  cetirizine (zyrTEC) 10 MG tablet, Take 1 tablet by mouth Daily., Disp: , Rfl:   •  estradiol (MINIVELLE, VIVELLE-DOT) 0.05 MG/24HR patch, Place 1 patch on the skin as directed by provider 2 (Two) Times a Week., Disp: , Rfl:   •  hydroCHLOROthiazide (MICROZIDE) 12.5 MG capsule, Take 1 capsule by mouth Every Other Day., Disp: , Rfl:   •  levonorgestrel (MIRENA) 20 MCG/24HR IUD, 1 each by Intrauterine route 1 (One) Time., Disp: , Rfl:   •  Multiple Vitamins-Minerals (MULTIVITAMIN WITH MINERALS) tablet tablet, Take 1 tablet by mouth Daily., Disp: , Rfl:   •  vitamin E 400 Units capsule, , Disp: , Rfl:   •  venlafaxine XR (Effexor XR) 75 MG 24 hr capsule, Take 1 capsule by mouth Daily., Disp: 90 capsule, Rfl: 4     Patient denies the need for medication refills today.    OB History        3    Para   3    Term   3            AB        Living   3       SAB        IAB        Ectopic        Molar        Multiple        Live Births                    Past Medical History:   Diagnosis Date   • Abnormal Pap smear of cervix     I think 2-3 years ago   • Anxiety    • Breast cancer 2022    Right   • Depression    • Hyperemesis gravidarum    • Hyperlipidemia    • Hypertension    • Ovarian cyst     25 years ago   • Urinary tract infection         Past Surgical History:   Procedure Laterality Date   • AUGMENTATION MAMMAPLASTY      saline, retro   • BREAST AUGMENTATION Bilateral 2019    saline removed and replaced with Gel Dr Kaye   • BREAST BIOPSY  2022   • BREAST RECONSTRUCTION N/A 01/10/2023    Procedure: BREAST RECONSTRUCTION RIGHT, DELAYED WITH LEFT IMMEDIATE RECONSTRUCTION W/ TISSUE EXPANDER AND ALLODERM INSERTION;  Surgeon: Emiliano Ackerman MD;  Location: [facility];  Service: Plastics;  Laterality: N/A;   • CHOLECYSTECTOMY     • DENTAL  PROCEDURE      Dental implant   • LAPAROSCOPIC CHOLECYSTECTOMY  2009   • MASTECTOMY Right 01/10/2023    Procedure: EXCISION OF RIGHT BREAST IMPLANT capsule, LEFT PROPHYLACTIC MASTECTOMY;  Surgeon: Sterling Yee MD;  Location:  [facility];  Service: General;  Laterality: Right;   • MASTECTOMY W/ SENTINEL NODE BIOPSY Right 06/28/2022    Procedure: BREAST SKIN SPARING  MASTECTOMY WITH SENTINEL NODE BIOPSY- RIGHT;  Surgeon: Sterling Yee MD;  Location:  [facility];  Service: General;  Laterality: Right;       Health Maintenance   Topic Date Due   • COLORECTAL CANCER SCREENING  Never done   • TDAP/TD VACCINES (1 - Tdap) Never done   • HEPATITIS C SCREENING  Never done   • ANNUAL PHYSICAL  Never done   • COVID-19 Vaccine (3 - Booster for Pfizer series) 04/01/2021   • ZOSTER VACCINE (1 of 2) Never done   • LIPID PANEL  05/02/2022   • Annual Gynecologic Pelvic and Breast Exam  05/03/2023   • INFLUENZA VACCINE  08/01/2023   • PAP SMEAR  05/02/2025   • Pneumococcal Vaccine 0-64  Aged Out       The additional following portions of the patient's history were reviewed and updated as appropriate: allergies, current medications, past family history, past medical history, past social history, past surgical history and problem list.    Review of Systems   Constitutional: Negative.    Respiratory: Negative.    Cardiovascular: Negative.    Gastrointestinal: Negative.    Endocrine: Positive for heat intolerance.   Genitourinary: Negative.          I have reviewed and agree with the HPI, ROS, and historical information as entered above. Marilia Hayes, APRN      Objective   /88   Wt 82.6 kg (182 lb)   BMI 26.11 kg/m²     Physical Exam  Vitals and nursing note reviewed. Exam conducted with a chaperone present.   Constitutional:       Appearance: Normal appearance.   Neck:      Thyroid: No thyroid mass or thyromegaly.   Pulmonary:      Effort: Pulmonary effort is normal.   Abdominal:      Palpations: Abdomen is  soft.   Genitourinary:     Labia:         Right: No rash, tenderness or lesion.         Left: No rash, tenderness or lesion.       Vagina: Normal. No lesions.      Cervix: No cervical motion tenderness, discharge, lesion or cervical bleeding.      Uterus: Normal. Not enlarged, not fixed and not tender.       Adnexa:         Right: No mass or tenderness.          Left: No mass or tenderness.        Rectum: No external hemorrhoid.      Comments: IUD strings visible and appropriate. Chaperone Present  Neurological:      Mental Status: She is alert.            Assessment and Plan    Problem List Items Addressed This Visit        Hematology and Neoplasia    Malignant neoplasm of upper-outer quadrant of right female breast    Relevant Orders    Ambulatory Referral to Hematology / Oncology   Other Visit Diagnoses     Atypical squamous cell changes of undetermined significance (ASCUS) on cervical cytology with negative high risk human papilloma virus (HPV) test result    -  Primary    Relevant Orders    LIQUID-BASED PAP SMEAR WITH HPV GENOTYPING REGARDLESS OF INTERPRETATION ([facility]AD)    Pap test, as part of routine gynecological examination        Relevant Orders    LIQUID-BASED PAP SMEAR WITH HPV GENOTYPING REGARDLESS OF INTERPRETATION ([facility],COR,[facility])    Hot flashes        Relevant Orders    TSH Rfx On Abnormal To Free T4    Women's annual routine gynecological examination        IUD check up        Screening for colon cancer        Relevant Orders    Ambulatory Referral For Screening Colonoscopy        She ended up not seeing Dr. Acevedo with oncology and she does wish to proceed with that consult. We discussed that I am unsure if it is truly safe/ideal for her to continue on HRT, discussed with ROSI who was unsure and would like oncology to consult on this. We will change from fetzima to effexor and she will try doing one patch weekly to try and wean, leave mirena for now. Re-evaluate after oncology consult.     1. GYN  annual well woman exam.   2. Pap guidelines reviewed.  3. Symptoms of menopausal transition reviewed with patient.   4. Return in about 1 year (around 5/3/2024) for Annual physical.   5. Discussed [facility] ovarian cancer screening  6. Refer for colonoscopy.       Marilia Hayes, APRN  05/03/2023

## 2023-05-05 LAB — REF LAB TEST METHOD: NORMAL

## 2023-05-09 ENCOUNTER — PRE-ADMISSION TESTING (OUTPATIENT)
Dept: PREADMISSION TESTING | Facility: HOSPITAL | Age: 53
End: 2023-05-09
Payer: COMMERCIAL

## 2023-05-09 VITALS — HEIGHT: 70 IN | WEIGHT: 181.88 LBS | BODY MASS INDEX: 26.04 KG/M2

## 2023-05-09 DIAGNOSIS — R23.2 HOT FLASHES: ICD-10-CM

## 2023-05-09 LAB
ANION GAP SERPL CALCULATED.3IONS-SCNC: 12 MMOL/L (ref 5–15)
BUN SERPL-MCNC: 19 MG/DL (ref 6–20)
BUN/CREAT SERPL: 30.2 (ref 7–25)
CALCIUM SPEC-SCNC: 9.8 MG/DL (ref 8.6–10.5)
CHLORIDE SERPL-SCNC: 103 MMOL/L (ref 98–107)
CO2 SERPL-SCNC: 27 MMOL/L (ref 22–29)
CREAT SERPL-MCNC: 0.63 MG/DL (ref 0.57–1)
DEPRECATED RDW RBC AUTO: 41 FL (ref 37–54)
EGFRCR SERPLBLD CKD-EPI 2021: 107.6 ML/MIN/1.73
ERYTHROCYTE [DISTWIDTH] IN BLOOD BY AUTOMATED COUNT: 12.4 % (ref 12.3–15.4)
GLUCOSE SERPL-MCNC: 100 MG/DL (ref 65–99)
HBA1C MFR BLD: 5.4 % (ref 4.8–5.6)
HCT VFR BLD AUTO: 41.7 % (ref 34–46.6)
HGB BLD-MCNC: 14.3 G/DL (ref 12–15.9)
MCH RBC QN AUTO: 30.9 PG (ref 26.6–33)
MCHC RBC AUTO-ENTMCNC: 34.3 G/DL (ref 31.5–35.7)
MCV RBC AUTO: 90.1 FL (ref 79–97)
PLATELET # BLD AUTO: 235 10*3/MM3 (ref 140–450)
PMV BLD AUTO: 9.9 FL (ref 6–12)
POTASSIUM SERPL-SCNC: 4.7 MMOL/L (ref 3.5–5.2)
RBC # BLD AUTO: 4.63 10*6/MM3 (ref 3.77–5.28)
SODIUM SERPL-SCNC: 142 MMOL/L (ref 136–145)
TSH SERPL DL<=0.05 MIU/L-ACNC: 1.09 UIU/ML (ref 0.27–4.2)
WBC NRBC COR # BLD: 7.54 10*3/MM3 (ref 3.4–10.8)

## 2023-05-09 PROCEDURE — 85027 COMPLETE CBC AUTOMATED: CPT

## 2023-05-09 PROCEDURE — 80048 BASIC METABOLIC PNL TOTAL CA: CPT

## 2023-05-09 PROCEDURE — 36415 COLL VENOUS BLD VENIPUNCTURE: CPT

## 2023-05-09 PROCEDURE — 83036 HEMOGLOBIN GLYCOSYLATED A1C: CPT

## 2023-05-09 PROCEDURE — 84443 ASSAY THYROID STIM HORMONE: CPT

## 2023-05-09 NOTE — PAT
An arrival time for procedure was not provided during PAT visit. If patient had any questions or concerns about their arrival time, they were instructed to contact their surgeon/physician.  Additionally, if the patient referred to an arrival time that was acquired from their my chart account, patient was encouraged to verify that time with their surgeon/physician. Arrival times are NOT provided in Pre Admission Testing Department.    Patient to apply Chlorhexadine wipes  to surgical area (as instructed) the night before procedure and the AM of procedure. Wipes provided.    Patient instructed to drink 20 ounces of Gatorade and it needs to be completed 1 hour (for Main OR patients) or 2 hours (scheduled  section & [facility] patients) before given arrival time for procedure (NO RED Gatorade)    Patient verbalized understanding.    Patient viewed general PAT education video as instructed in their preoperative information received from their surgeon.  Patient stated the general PAT education video was viewed in its entirety and survey completed.  Copies of PAT general education handouts (Incentive Spirometry, Meds to Beds Program, Patient Belongings, Pre-op skin preparation instructions, Blood Glucose testing, Visitor policy, Surgery FAQ, Code H) distributed to patient if not printed. Education related to the PAT pass and skin preparation for surgery (if applicable) completed in PAT as a reinforcement to PAT education video. Patient instructed to return PAT pass provided today as well as completed skin preparation sheet (if applicable) on the day of procedure.     Additionally if patient had not viewed video yet but intended to view it at home or in our waiting area, then referred them to the handout with QR code/link provided during PAT visit.  Instructed patient to complete survey after viewing the video in its entirety.  Encouraged patient/family to read PAT general education handouts thoroughly and notify PAT  staff with any questions or concerns. Patient verbalized understanding of all information and priority content.    Patient denies any current skin issues.     Per Anesthesia Request, patient instructed not to take their ACE/ARB medications on the AM of surgery.    EKG ON CHART FROM 12/30/22. PT DENIES CHEST PAIN/SOA.

## 2023-05-15 ENCOUNTER — ANESTHESIA EVENT (OUTPATIENT)
Dept: PERIOP | Facility: HOSPITAL | Age: 53
End: 2023-05-15
Payer: COMMERCIAL

## 2023-05-16 ENCOUNTER — ANESTHESIA (OUTPATIENT)
Dept: PERIOP | Facility: HOSPITAL | Age: 53
End: 2023-05-16
Payer: COMMERCIAL

## 2023-05-16 ENCOUNTER — HOSPITAL ENCOUNTER (OUTPATIENT)
Facility: HOSPITAL | Age: 53
Setting detail: HOSPITAL OUTPATIENT SURGERY
Discharge: HOME | End: 2023-05-16
Attending: PLASTIC SURGERY | Admitting: PLASTIC SURGERY
Payer: COMMERCIAL

## 2023-05-16 VITALS
HEART RATE: 72 BPM | TEMPERATURE: 97.9 F | HEIGHT: 70 IN | DIASTOLIC BLOOD PRESSURE: 85 MMHG | RESPIRATION RATE: 16 BRPM | WEIGHT: 181.88 LBS | OXYGEN SATURATION: 96 % | SYSTOLIC BLOOD PRESSURE: 134 MMHG | BODY MASS INDEX: 26.04 KG/M2

## 2023-05-16 PROCEDURE — 25010000002 GENTAMICIN PER 80 MG: Performed by: PLASTIC SURGERY

## 2023-05-16 PROCEDURE — 25010000002 CEFAZOLIN PER 500 MG: Performed by: PLASTIC SURGERY

## 2023-05-16 PROCEDURE — C1789 PROSTHESIS, BREAST, IMP: HCPCS | Performed by: PLASTIC SURGERY

## 2023-05-16 PROCEDURE — 25010000002 ONDANSETRON PER 1 MG

## 2023-05-16 PROCEDURE — 25010000002 DEXAMETHASONE PER 1 MG: Performed by: NURSE ANESTHETIST, CERTIFIED REGISTERED

## 2023-05-16 PROCEDURE — 25010000002 PROPOFOL 10 MG/ML EMULSION: Performed by: NURSE ANESTHETIST, CERTIFIED REGISTERED

## 2023-05-16 PROCEDURE — 25010000002 CEFAZOLIN IN DEXTROSE 2-4 GM/100ML-% SOLUTION: Performed by: PLASTIC SURGERY

## 2023-05-16 PROCEDURE — 25010000002 ONDANSETRON PER 1 MG: Performed by: NURSE ANESTHETIST, CERTIFIED REGISTERED

## 2023-05-16 DEVICE — BRST GEL NATRELLE INSPIRA SMOTH COHESIVE XF/P 800CC: Type: IMPLANTABLE DEVICE | Site: BREAST | Status: FUNCTIONAL

## 2023-05-16 DEVICE — DEV CONTRL TISS STRATAFIX SPIRAL MNCRYL UD 3/0 PLS 30CM: Type: IMPLANTABLE DEVICE | Site: BREAST | Status: FUNCTIONAL

## 2023-05-16 RX ORDER — LIDOCAINE HYDROCHLORIDE 10 MG/ML
INJECTION, SOLUTION EPIDURAL; INFILTRATION; INTRACAUDAL; PERINEURAL AS NEEDED
Status: DISCONTINUED | OUTPATIENT
Start: 2023-05-16 | End: 2023-05-16 | Stop reason: SURG

## 2023-05-16 RX ORDER — EPHEDRINE SULFATE 50 MG/ML
5 INJECTION, SOLUTION INTRAVENOUS ONCE AS NEEDED
Status: DISCONTINUED | OUTPATIENT
Start: 2023-05-16 | End: 2023-05-16 | Stop reason: HOSPADM

## 2023-05-16 RX ORDER — FENTANYL CITRATE 50 UG/ML
50 INJECTION, SOLUTION INTRAMUSCULAR; INTRAVENOUS
Status: DISCONTINUED | OUTPATIENT
Start: 2023-05-16 | End: 2023-05-16 | Stop reason: HOSPADM

## 2023-05-16 RX ORDER — SODIUM CHLORIDE 0.9 % (FLUSH) 0.9 %
10 SYRINGE (ML) INJECTION AS NEEDED
Status: DISCONTINUED | OUTPATIENT
Start: 2023-05-16 | End: 2023-05-16 | Stop reason: HOSPADM

## 2023-05-16 RX ORDER — ACETAMINOPHEN 650 MG/1
650 SUPPOSITORY RECTAL ONCE AS NEEDED
Status: COMPLETED | OUTPATIENT
Start: 2023-05-16 | End: 2023-05-16

## 2023-05-16 RX ORDER — MIDAZOLAM HYDROCHLORIDE 1 MG/ML
1 INJECTION INTRAMUSCULAR; INTRAVENOUS
Status: DISCONTINUED | OUTPATIENT
Start: 2023-05-16 | End: 2023-05-16 | Stop reason: HOSPADM

## 2023-05-16 RX ORDER — PROPOFOL 10 MG/ML
VIAL (ML) INTRAVENOUS AS NEEDED
Status: DISCONTINUED | OUTPATIENT
Start: 2023-05-16 | End: 2023-05-16 | Stop reason: SURG

## 2023-05-16 RX ORDER — FAMOTIDINE 10 MG/ML
20 INJECTION, SOLUTION INTRAVENOUS ONCE
Status: CANCELLED | OUTPATIENT
Start: 2023-05-16 | End: 2023-05-16

## 2023-05-16 RX ORDER — SODIUM CHLORIDE, SODIUM LACTATE, POTASSIUM CHLORIDE, CALCIUM CHLORIDE 600; 310; 30; 20 MG/100ML; MG/100ML; MG/100ML; MG/100ML
9 INJECTION, SOLUTION INTRAVENOUS CONTINUOUS
Status: DISCONTINUED | OUTPATIENT
Start: 2023-05-16 | End: 2023-05-16 | Stop reason: HOSPADM

## 2023-05-16 RX ORDER — FAMOTIDINE 20 MG/1
20 TABLET, FILM COATED ORAL ONCE
Status: COMPLETED | OUTPATIENT
Start: 2023-05-16 | End: 2023-05-16

## 2023-05-16 RX ORDER — LIDOCAINE HYDROCHLORIDE AND EPINEPHRINE 10; 10 MG/ML; UG/ML
INJECTION, SOLUTION INFILTRATION; PERINEURAL AS NEEDED
Status: DISCONTINUED | OUTPATIENT
Start: 2023-05-16 | End: 2023-05-16 | Stop reason: HOSPADM

## 2023-05-16 RX ORDER — LIDOCAINE HYDROCHLORIDE 10 MG/ML
0.5 INJECTION, SOLUTION EPIDURAL; INFILTRATION; INTRACAUDAL; PERINEURAL ONCE AS NEEDED
Status: COMPLETED | OUTPATIENT
Start: 2023-05-16 | End: 2023-05-16

## 2023-05-16 RX ORDER — ONDANSETRON 2 MG/ML
INJECTION INTRAMUSCULAR; INTRAVENOUS AS NEEDED
Status: DISCONTINUED | OUTPATIENT
Start: 2023-05-16 | End: 2023-05-16 | Stop reason: SURG

## 2023-05-16 RX ORDER — ACETAMINOPHEN 325 MG/1
650 TABLET ORAL ONCE AS NEEDED
Status: COMPLETED | OUTPATIENT
Start: 2023-05-16 | End: 2023-05-16

## 2023-05-16 RX ORDER — EPHEDRINE SULFATE 50 MG/ML
INJECTION INTRAVENOUS AS NEEDED
Status: DISCONTINUED | OUTPATIENT
Start: 2023-05-16 | End: 2023-05-16 | Stop reason: SURG

## 2023-05-16 RX ORDER — CEFAZOLIN SODIUM 2 G/100ML
2 INJECTION, SOLUTION INTRAVENOUS ONCE
Status: COMPLETED | OUTPATIENT
Start: 2023-05-16 | End: 2023-05-16

## 2023-05-16 RX ORDER — DEXAMETHASONE SODIUM PHOSPHATE 4 MG/ML
INJECTION, SOLUTION INTRA-ARTICULAR; INTRALESIONAL; INTRAMUSCULAR; INTRAVENOUS; SOFT TISSUE AS NEEDED
Status: DISCONTINUED | OUTPATIENT
Start: 2023-05-16 | End: 2023-05-16 | Stop reason: SURG

## 2023-05-16 RX ORDER — LABETALOL HYDROCHLORIDE 5 MG/ML
5 INJECTION, SOLUTION INTRAVENOUS
Status: DISCONTINUED | OUTPATIENT
Start: 2023-05-16 | End: 2023-05-16 | Stop reason: HOSPADM

## 2023-05-16 RX ORDER — SODIUM CHLORIDE 9 MG/ML
40 INJECTION, SOLUTION INTRAVENOUS AS NEEDED
Status: DISCONTINUED | OUTPATIENT
Start: 2023-05-16 | End: 2023-05-16 | Stop reason: HOSPADM

## 2023-05-16 RX ORDER — ONDANSETRON 2 MG/ML
4 INJECTION INTRAMUSCULAR; INTRAVENOUS ONCE AS NEEDED
Status: COMPLETED | OUTPATIENT
Start: 2023-05-16 | End: 2023-05-16

## 2023-05-16 RX ORDER — SODIUM CHLORIDE 0.9 % (FLUSH) 0.9 %
10 SYRINGE (ML) INJECTION EVERY 12 HOURS SCHEDULED
Status: CANCELLED | OUTPATIENT
Start: 2023-05-16

## 2023-05-16 RX ORDER — ACETAMINOPHEN 325 MG/1
TABLET ORAL
Status: COMPLETED
Start: 2023-05-16 | End: 2023-05-16

## 2023-05-16 RX ORDER — OXYCODONE HYDROCHLORIDE 5 MG/1
5 TABLET ORAL ONCE AS NEEDED
Status: COMPLETED | OUTPATIENT
Start: 2023-05-16 | End: 2023-05-16

## 2023-05-16 RX ORDER — ONDANSETRON 2 MG/ML
INJECTION INTRAMUSCULAR; INTRAVENOUS
Status: COMPLETED
Start: 2023-05-16 | End: 2023-05-16

## 2023-05-16 RX ORDER — OXYCODONE HYDROCHLORIDE 5 MG/1
TABLET ORAL
Status: COMPLETED
Start: 2023-05-16 | End: 2023-05-16

## 2023-05-16 RX ADMIN — SODIUM CHLORIDE, POTASSIUM CHLORIDE, SODIUM LACTATE AND CALCIUM CHLORIDE: 600; 310; 30; 20 INJECTION, SOLUTION INTRAVENOUS at 15:33

## 2023-05-16 RX ADMIN — PROPOFOL 200 MG: 10 INJECTION, EMULSION INTRAVENOUS at 14:13

## 2023-05-16 RX ADMIN — OXYCODONE HYDROCHLORIDE 5 MG: 5 TABLET ORAL at 17:03

## 2023-05-16 RX ADMIN — ONDANSETRON 4 MG: 2 INJECTION INTRAMUSCULAR; INTRAVENOUS at 18:07

## 2023-05-16 RX ADMIN — PROPOFOL 25 MCG/KG/MIN: 10 INJECTION, EMULSION INTRAVENOUS at 14:22

## 2023-05-16 RX ADMIN — LIDOCAINE HYDROCHLORIDE 0.5 ML: 10 INJECTION, SOLUTION EPIDURAL; INFILTRATION; INTRACAUDAL; PERINEURAL at 12:57

## 2023-05-16 RX ADMIN — CEFAZOLIN SODIUM 2 G: 2 INJECTION, SOLUTION INTRAVENOUS at 14:11

## 2023-05-16 RX ADMIN — ACETAMINOPHEN 650 MG: 325 TABLET ORAL at 18:13

## 2023-05-16 RX ADMIN — EPHEDRINE SULFATE 10 MG: 50 INJECTION INTRAVENOUS at 14:25

## 2023-05-16 RX ADMIN — ONDANSETRON 4 MG: 2 INJECTION INTRAMUSCULAR; INTRAVENOUS at 14:22

## 2023-05-16 RX ADMIN — DEXAMETHASONE SODIUM PHOSPHATE 8 MG: 4 INJECTION, SOLUTION INTRAMUSCULAR; INTRAVENOUS at 14:22

## 2023-05-16 RX ADMIN — LIDOCAINE HYDROCHLORIDE 50 MG: 10 INJECTION, SOLUTION EPIDURAL; INFILTRATION; INTRACAUDAL; PERINEURAL at 14:13

## 2023-05-16 RX ADMIN — SODIUM CHLORIDE, POTASSIUM CHLORIDE, SODIUM LACTATE AND CALCIUM CHLORIDE 9 ML/HR: 600; 310; 30; 20 INJECTION, SOLUTION INTRAVENOUS at 12:56

## 2023-05-16 RX ADMIN — FAMOTIDINE 20 MG: 20 TABLET ORAL at 12:57

## 2023-05-16 RX ADMIN — EPHEDRINE SULFATE 10 MG: 50 INJECTION INTRAVENOUS at 16:29

## 2023-05-16 NOTE — PROGRESS NOTES
"  Subjective     PROBLEM LIST:  1. 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  Pathology showed multifocal microinvasive carcinoma and a 3 cm area of high-grade DCIS.  Subsequent prophylactic left mastectomy.  2.  Depression and anxiety  3.  Hyperlipidemia  4.  Hypertension    CHIEF COMPLAINT: History of microinvasive breast cancer      HISTORY OF PRESENT ILLNESS:  The patient is a 52 y.o. female, referred for evaluation of the use of menopausal hormone therapy in the setting of a history of ER/PA negative microinvasive breast cancer status post bilateral mastectomy.    She had right mastectomy last summer and subsequent left prophylactic mastectomy.  She has recently had permanent implant placement.  She is still a little sore from this but otherwise feeling well.    She has been taking the estradiol patch as well as a Mirena IUD for menopausal symptoms.  She is here to discuss continued use in the setting of a prior breast cancer diagnosis.    REVIEW OF SYSTEMS:  A 14 point review of systems was performed and is negative except as noted above.    Past Medical History:   Diagnosis Date   • Abnormal Pap smear of cervix     I think 2-3 years ago   • Anxiety    • Breast cancer 06/2022    Right   • Depression    • Hyperemesis gravidarum    • Hyperlipidemia    • Hypertension    • Ovarian cyst     25 years ago   • Urinary tract infection     HX             Objective     /75   Pulse 60   Temp 98 °F (36.7 °C) (Infrared)   Resp 16   Ht 177.8 cm (70\")   Wt 83.9 kg (185 lb)   SpO2 98%   BMI 26.54 kg/m²   Performance Status:  ECOG score: 0           General: well appearing female in no acute distress  Neuro: alert and oriented  HEENT: sclerae anicteric, oropharynx clear  Extremities: no lower extremity edema  Skin: no rashes, lesions, bruising, or petechiae  Psych: mood and affect appropriate    Lab Results   Component " Value Date    WBC 7.54 05/09/2023    HGB 14.3 05/09/2023    HCT 41.7 05/09/2023    MCV 90.1 05/09/2023     05/09/2023     Lab Results   Component Value Date    GLUCOSE 100 (H) 05/09/2023    BUN 19 05/09/2023    CREATININE 0.63 05/09/2023    EGFRIFNONA 85 05/14/2020    BCR 30.2 (H) 05/09/2023    K 4.7 05/09/2023    CO2 27.0 05/09/2023    CALCIUM 9.8 05/09/2023    ALBUMIN 5.0 12/30/2022    AST 16 12/30/2022    ALT 16 12/30/2022       CT Chest Low Dose Cancer Screening WO  Narrative: CT CHEST LOW DOSE CANCER SCREENING WO    Date of Exam: 3/20/2023 8:31 AM EDT    Indication: Tobacco abuse, 32 pack year history of smoking, history of breast cancer, screening for lung cancer..    Comparison: Chest x-ray performed on 2/21/2010.    Technique: Low dose CT imaging of the chest was performed without intravenous contrast enhancement.  Automated exposure control and iterative reconstruction methods were used.     Findings:    There are no suspicious pulmonary nodules or masses to indicate lung cancer. There is chronic scarring versus subsegmental atelectasis in the lingula. There are no layering pleural effusions. There are no enlarged hilar or mediastinal lymph nodes. There   are bilateral breast implants in place. There are no enlarged axillary lymph nodes. There are no acute findings beneath the hemidiaphragms. There are no suspicious osteolytic or sclerotic lesions within the bony structures.  Impression: Impression:   No suspicious pulmonary nodules or masses to indicate lung cancer.    Recommendation:   Continue annual screening with LDCT    Lung Rads Assessment:   Lung-RADS L1 - Negative, <1% chance of malignancy.     Electronically Signed: Junior Sloan    3/20/2023 2:16 PM EDT    Workstation ID: [id number]            ASSESSMENT AND PLAN:     Lita Pisano is a 52 y.o. female with a diagnosis of ER/FL negative microinvasive carcinoma of the right breast.    Her breast cancer was microinvasive and hormone receptor  negative.  There is no role for adjuvant systemic therapy in this situation.  She does not need any routine imaging for her low risk cancer, and does not need any breast imaging postmastectomy.    In terms of the ongoing use of menopausal hormone therapy, I do not think she is at any increased risk from a breast cancer standpoint with this treatment.  She has a ER and UT negative cancer and has had bilateral mastectomy.  She is using a IUD which hopefully would provide some endometrial protection.    No oncology follow-up is needed.  I am happy to see her in the future if additional questions arise.           A total greater than 45 mins minutes was spent in face to face patient time, examination, counseling, charting, reviewing test results, and reviewing outside records.    Sherine Acevedo MD    5/17/2023

## 2023-05-16 NOTE — ANESTHESIA POSTPROCEDURE EVALUATION
Patient: Lita Pisano    Procedure Summary     Date: 05/16/23 Room / Location:  [facility] / [facility]    Anesthesia Start: 1407 Anesthesia Stop: 1648    Procedure: BILATERAL BREAST TISSUE EXPANDERS EXCHANGE TO PERMANENT IMPLANTS (Bilateral: Breast) Diagnosis:     Surgeons: Emiliano Ackerman MD Provider: Manfred Morrow MD    Anesthesia Type: general ASA Status: 2          Anesthesia Type: general    Vitals  Vitals Value Taken Time   /81 05/16/23 1645   Temp     Pulse 93 05/16/23 1647   Resp     SpO2 100 % 05/16/23 1647   Vitals shown include unvalidated device data.        Post Anesthesia Care and Evaluation    Patient location during evaluation: PACU  Patient participation: complete - patient participated  Level of consciousness: awake and alert  Pain management: adequate    Airway patency: patent  Anesthetic complications: No anesthetic complications  PONV Status: none  Cardiovascular status: hemodynamically stable and acceptable  Respiratory status: nonlabored ventilation, acceptable and nasal cannula  Hydration status: acceptable

## 2023-05-16 NOTE — OP NOTE
DATE OF PROCEDURE: 05/16/23    PREOPERATIVE DIAGNOSIS: Bilateral absent breasts with tissue expanders in place, status post mastectomies.     POSTOPERATIVE DIAGNOSIS: Bilateral absent breasts with tissue expanders in place, status post mastectomies.     PROCEDURES PERFORMED:  1. Removal of bilateral breast tissue expanders and placement of permanent silicone breast implants.   2. Bilateral open periprosthetic capsulotomy.    SURGEON: Emiliano Ackerman MD    ASSISTANT: Assistant: Silverio Rocha PA-C; Elizabeth Gallagher PA     Circulator: Lala Hartley RN; Reji Medina RN  Scrub Person: Shaylee Mayfield; Socorro López RN  Assistant: Silverio Rocha PA-C; Elizabeth Gallagher PA     Assistant: Silverio Rocha PA-C; Elizabeth Gallagher PA  was responsible for performing the following activities: Suturing and their skilled assistance was necessary for the success of this case.    ANESTHESIA: General.    INDICATIONS: The patient is a 52-year-old female who had breast cancer and she underwent bilateral mastectomies. The patient had tissue expanders placed at that time. She finished her subsequent expansion on both sides. She was taken to the operating room today for removal of bilateral breast tissue expanders and placement of permanent breast implants. The patient understood all of the risks and benefits of the procedure, including infection, need for future surgeries and elected to proceed.     FINDINGS:  1. Placement of an Allergan-style  mL implant silicone on the right with a serial number of [id number].  2. Placement of an Allergan-style  mL silicone breast implant on the left with a serial number of [id number].     DESCRIPTION OF PROCEDURE: The patient was seen in preoperative holding and then marked in a standing position and taken to the operating room by anesthesia after informed consent was signed and on the chart. The patient was placed supine on the operating room table and general anesthesia was induced. Once  verified, the case was then turned over to the surgical service. The patient had her chest and abdomen prepped and draped in a normal sterile fashion. A timeout was called and all parties were in agreement including nursing and anesthesia. Preoperative antibiotics were given. We began on the right side with a #15 blade scalpel making an incision with Bovie cautery down to the capsule. The expander was encountered. I removed all of the saline fluid with an 18-gauge needle and then the expander was then removed from the pocket. The pocket was inspected and noted to be in very tight position. Copious antibiotic irrigation was used in the pocket, and then a significant open capsulotomy was performed on the right. I then moved to the left side and removed the expander in a similar fashion. She was noted to have a tight capsule. We therefore performed a significant open periprosthetic capsulotomy to open the lateral capsule. Again, multiple liters of antibiotic irrigation were used in the pocket. I then scored the entire capsule both inferiorly, anteriorly and superiorly all over to allow it to expand better. This was done several times, even after placing sizers to try to expand the skin as best as possible. I placed multiple different sizers and settled with the aforementioned implants of 800 mL implant on the right and a 800 mL on the left for symmetry purposes. At this point, the capsules were then closed with 2-0 Vicryl in an interrupted fashion and then a 3-0 Monocryl and a 4-0 Monocryl to close the skin. Skin Affix glue was placed on the incisions. She then had Kerlix fluffs and a surgical bra placed. She was awakened, extubated and taken to PACU in stable condition. All sponge and instrument counts were correct. I, Dr. Emiliano Ackerman, was present and scrubbed for the entire procedure.     SPECIMENS: None        ESTIMATED BLOOD LOSS: 50 cc.    DRAINS: None.     COMPLICATIONS: None immediate.       Emiliano Locke  MD Meka  05/16/23  16:52 EDT

## 2023-05-16 NOTE — ANESTHESIA PROCEDURE NOTES
Airway  Urgency: elective    Date/Time: 5/16/2023 2:14 PM  Airway not difficult    General Information and Staff    Patient location during procedure: OR  CRNA/CAA: Henna Nj CRNA    Indications and Patient Condition  Indications for airway management: airway protection    Preoxygenated: yes  Mask difficulty assessment: 1 - vent by mask    Final Airway Details  Final airway type: supraglottic airway      Successful airway: I-gel  Size 4     Number of attempts at approach: 1  Assessment: lips, teeth, and gum same as pre-op    Additional Comments  LMA placed without difficulty, ventilation with assist, equal breath sounds and symmetric chest rise and fall

## 2023-05-16 NOTE — BRIEF OP NOTE
BREAST RECONSTRUCTION, BREAST TISSUE EXPANDER REMOVAL, IMPLANT INSERTION  Progress Note    Lita Pisano  5/16/2023    Pre-op Diagnosis:   1. Absent breasts after mastectomies for breast cancer  2. History of breast cancer       Post-Op Diagnosis Codes:   same    Procedure/CPT® Codes:        Procedure(s):  BILATERAL BREAST TISSUE EXPANDERS EXCHANGE TO PERMANENT IMPLANTS              Surgeon(s):  Emiliano Ackerman MD    Anesthesia: General    Staff:   Circulator: Lala Hartley RN; Reji Medina RN  Scrub Person: Shaylee Mayfield; Socorro López RN  Assistant: Silverio Rocha PA-C; Elizabeth Gallagher PA  Assistant: Silverio Rocha PA-C; Elizabeth Gallagher PA      Estimated Blood Loss: 50 cc    Urine Voided: * No values recorded between 5/16/2023  2:05 PM and 5/16/2023  4:42 PM *    Specimens:                None          Drains:   [REMOVED] Closed/Suction Drain 1 Inferior;Lateral;Right Breast Bulb 15 Fr. (Removed)       [REMOVED] Closed/Suction Drain 1 Inferior;Lateral;Left Breast Bulb 15 Fr. (Removed)       Findings: see op note        Complications: none immediate     Assistant: Silverio Rocha PA-C; Elizabeth Gallagher PA  was responsible for performing the following activities: Suturing and their skilled assistance was necessary for the success of this case.    Emiliano Ackerman MD     Date: 5/16/2023  Time: 16:52 EDT

## 2023-05-16 NOTE — ANESTHESIA PREPROCEDURE EVALUATION
Anesthesia Evaluation     Patient summary reviewed and Nursing notes reviewed   no history of anesthetic complications:  NPO Solid Status: > 8 hours  NPO Liquid Status: > 8 hours           Airway   Mallampati: II  TM distance: >3 FB  Neck ROM: full  No difficulty expected  Dental      Pulmonary - negative pulmonary ROS and normal exam   Cardiovascular - normal exam    (+) hypertension, hyperlipidemia,       Neuro/Psych  (+) psychiatric history,    GI/Hepatic/Renal/Endo      Musculoskeletal     Abdominal    Substance History      OB/GYN          Other                        Anesthesia Plan    ASA 2     general     intravenous induction     Anesthetic plan, risks, benefits, and alternatives have been provided, discussed and informed consent has been obtained with: patient.    Plan discussed with CRNA.        CODE STATUS:

## 2023-05-16 NOTE — H&P
Pre-Op H&P  Lita Pisano  [id number]  1970      Chief complaint: Breast cancer      Subjective:  Patient is a 52 y.o.female presents for scheduled surgery by Dr. Ackerman. She anticipates a BILATERAL BREAST TISSUE EXPANDERS EXCHANGE TO PERMANENT IMPLANTS today. 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 On 6/28/22, she underwent right breast skin sparing mastectomy with sentinel node bx. On 1/10/23, she underwent excision of right breast implant, prophylactic left breast mastectomy and tissue expander on the left.      Review of Systems:  Constitutional-- No fever, chills or sweats. No fatigue.  CV-- No chest pain, palpitation or syncope. +HTN, HLD  Resp-- No SOB, cough, hemoptysis  Skin--No rashes or lesions      Allergies:   Allergies   Allergen Reactions   • Sulfa Antibiotics Rash         Home Meds:  Medications Prior to Admission   Medication Sig Dispense Refill Last Dose   • atorvastatin (LIPITOR) 10 MG tablet Take 1 tablet by mouth Daily.      • Barberry-Oreg Grape-Goldenseal (BERBERINE COMPLEX PO) Take 1 capsule by mouth Daily.      • cetirizine (zyrTEC) 10 MG tablet Take 1 tablet by mouth Daily.      • estradiol (MINIVELLE, VIVELLE-DOT) 0.05 MG/24HR patch Place 1 patch on the skin as directed by provider 2 (Two) Times a Week. EVERY THIRD DAY      • hydroCHLOROthiazide (MICROZIDE) 12.5 MG capsule Take 1 capsule by mouth Every Other Day.      • levonorgestrel (MIRENA) 20 MCG/24HR IUD 1 each by Intrauterine route 1 (One) Time.      • Multiple Vitamins-Minerals (MULTIVITAMIN WITH MINERALS) tablet tablet Take 1 tablet by mouth Daily.      • nebivolol (BYSTOLIC) 10 MG tablet Take 1 tablet by mouth Every Morning.      • venlafaxine XR (Effexor XR) 75 MG 24 hr capsule Take 1 capsule by mouth Daily. 90 capsule 4    • vitamin E 400 Units capsule Take 1 capsule by mouth Daily.             PMH:   Past Medical History:   Diagnosis Date   • Abnormal Pap smear of cervix     I think 2-3 years ago   • Anxiety    • Breast cancer 2022    Right   • Depression    • Hyperemesis gravidarum    • Hyperlipidemia    • Hypertension    • Ovarian cyst     25 years ago   • Urinary tract infection     HX     PSH:    Past Surgical History:   Procedure Laterality Date   • AUGMENTATION MAMMAPLASTY      saline, retro   • BREAST AUGMENTATION Bilateral 2019    saline removed and replaced with Gel Dr Kaye   • BREAST BIOPSY  2022   • BREAST RECONSTRUCTION N/A 01/10/2023    Procedure: BREAST RECONSTRUCTION RIGHT, DELAYED WITH LEFT IMMEDIATE RECONSTRUCTION W/ TISSUE EXPANDER AND ALLODERM INSERTION;  Surgeon: Emiliano Ackerman MD;  Location:  [facility];  Service: Plastics;  Laterality: N/A;   • CHOLECYSTECTOMY     • DENTAL PROCEDURE      Dental implant   • MASTECTOMY Right 01/10/2023    Procedure: EXCISION OF RIGHT BREAST IMPLANT capsule, LEFT PROPHYLACTIC MASTECTOMY;  Surgeon: Sterling Yee MD;  Location:  [facility];  Service: General;  Laterality: Right;   • MASTECTOMY W/ SENTINEL NODE BIOPSY Right 2022    Procedure: BREAST SKIN SPARING  MASTECTOMY WITH SENTINEL NODE BIOPSY- RIGHT;  Surgeon: Sterling Yee MD;  Location:  [facility];  Service: General;  Laterality: Right;       Immunization History:  Influenza:   Pneumococcal: No  Tetanus: Unknown  Covid: x2    Social History:   Tobacco:   Social History     Tobacco Use   Smoking Status Former   • Packs/day: 0.50   • Years: 32.00   • Pack years: 16.00   • Types: Cigarettes   • Quit date: 2022   • Years since quittin.8   Smokeless Tobacco Never      Alcohol:     Social History     Substance and Sexual Activity   Alcohol Use Yes   • Alcohol/week: 1.0 standard drink   • Types: 1 Glasses of wine per week    Comment: SOCIAL         Physical Exam: VS: /74  HR 54  RR 16  T 97.3  Sat 97%RA      General Appearance:     Alert, cooperative, no distress, appears stated age   Head:    Normocephalic, without obvious abnormality, atraumatic   Lungs:     Clear to auscultation bilaterally, respirations unlabored    Heart:   Regular rate and rhythm, S1 and S2 normal    Abdomen:    Soft without tenderness   Extremities:   Extremities normal, atraumatic, no cyanosis or edema   Skin:   Skin color, texture, turgor normal, no rashes or lesions   Neurologic:   Grossly intact     Results Review:     LABS:  Lab Results   Component Value Date    WBC 7.54 05/09/2023    HGB 14.3 05/09/2023    HCT 41.7 05/09/2023    MCV 90.1 05/09/2023     05/09/2023    NEUTROABS 5.26 09/03/2019    GLUCOSE 100 (H) 05/09/2023    BUN 19 05/09/2023    CREATININE 0.63 05/09/2023    EGFRIFNONA 85 05/14/2020     05/09/2023    K 4.7 05/09/2023     05/09/2023    CO2 27.0 05/09/2023    CALCIUM 9.8 05/09/2023    ALBUMIN 5.0 12/30/2022    AST 16 12/30/2022    ALT 16 12/30/2022    BILITOT 0.3 12/30/2022       RADIOLOGY:  Imaging Results (Last 72 Hours)     ** No results found for the last 72 hours. **          I reviewed the patient's new clinical results.    Cancer Staging (if applicable)  Cancer Patient: __ yes __no __unknown; If yes, clinical stage T:__ N:__M:__, stage group or __N/A      Impression: Stage I (T1, N0, M0) DCIS and IDC right breast; ER/MS-      Plan: BILATERAL BREAST TISSUE EXPANDERS EXCHANGE TO PERMANENT IMPLANTS      DEVON Paris   5/16/2023   12:25 EDT

## 2023-05-17 ENCOUNTER — CONSULT (OUTPATIENT)
Dept: ONCOLOGY | Facility: CLINIC | Age: 53
End: 2023-05-17
Payer: COMMERCIAL

## 2023-05-17 VITALS
HEIGHT: 70 IN | RESPIRATION RATE: 16 BRPM | OXYGEN SATURATION: 98 % | SYSTOLIC BLOOD PRESSURE: 140 MMHG | BODY MASS INDEX: 26.48 KG/M2 | HEART RATE: 60 BPM | TEMPERATURE: 98 F | DIASTOLIC BLOOD PRESSURE: 75 MMHG | WEIGHT: 185 LBS

## 2023-05-17 DIAGNOSIS — C50.411 MALIGNANT NEOPLASM OF UPPER-OUTER QUADRANT OF RIGHT BREAST IN FEMALE, ESTROGEN RECEPTOR NEGATIVE: Primary | ICD-10-CM

## 2023-05-17 DIAGNOSIS — Z17.1 MALIGNANT NEOPLASM OF UPPER-OUTER QUADRANT OF RIGHT BREAST IN FEMALE, ESTROGEN RECEPTOR NEGATIVE: Primary | ICD-10-CM

## 2023-05-17 PROCEDURE — 99204 OFFICE O/P NEW MOD 45 MIN: CPT | Performed by: INTERNAL MEDICINE

## 2023-05-17 RX ORDER — CLINDAMYCIN PHOSPHATE 10 UG/ML
LOTION TOPICAL
COMMUNITY
Start: 2023-02-15

## 2023-05-17 RX ORDER — ONDANSETRON 4 MG/1
TABLET, ORALLY DISINTEGRATING ORAL
COMMUNITY
Start: 2023-05-01

## 2023-05-17 RX ORDER — DOCUSATE SODIUM 100 MG/1
1 CAPSULE, LIQUID FILLED ORAL EVERY 12 HOURS SCHEDULED
COMMUNITY
Start: 2023-05-01

## 2023-05-17 RX ORDER — OXYCODONE HYDROCHLORIDE 5 MG/1
1 TABLET ORAL
COMMUNITY
Start: 2023-05-01

## 2023-05-17 RX ORDER — BENZOYL PEROXIDE 100 MG/ML
LIQUID TOPICAL
COMMUNITY
Start: 2023-02-20

## 2023-05-17 RX ORDER — CEPHALEXIN 500 MG/1
1 CAPSULE ORAL 3 TIMES DAILY
COMMUNITY
Start: 2023-05-01

## 2023-05-17 NOTE — LETTER
May 17, 2023     DEVON Bryant  [address]  [place] [address]    Patient: Lita Pisano   YOB: 1970   Date of Visit: 5/17/2023       Dear DEVON Bryant    Lita Pisano was in my office today. Below is a copy of my note.    If you have questions, please do not hesitate to call me. I look forward to following Lita along with you.         Sincerely,        Sherine Acevedo MD        CC: DEVON Vera      Subjective      PROBLEM LIST:  1. [id number] ER negative [place] negative HER2 not performed microinvasive carcinoma of the right breast  A) right breast mastectomy on 6/22/2022.  Pathology showed multifocal microinvasive carcinoma and a 3 cm area of high-grade DCIS.  Subsequent prophylactic left mastectomy.  2.  Depression and anxiety  3.  Hyperlipidemia  4.  Hypertension    CHIEF COMPLAINT: History of microinvasive breast cancer      HISTORY OF PRESENT ILLNESS:  The patient is a 52 y.o. female, referred for evaluation of the use of menopausal hormone therapy in the setting of a history of ER/[place] negative microinvasive breast cancer status post bilateral mastectomy.    She had right mastectomy last summer and subsequent left prophylactic mastectomy.  She has recently had permanent implant placement.  She is still a little sore from this but otherwise feeling well.    She has been taking the estradiol patch as well as a Mirena IUD for menopausal symptoms.  She is here to discuss continued use in the setting of a prior breast cancer diagnosis.    REVIEW OF SYSTEMS:  A 14 point review of systems was performed and is negative except as noted above.    Past Medical History:   Diagnosis Date   • Abnormal Pap smear of cervix     I think 2-3 years ago   • Anxiety    • Breast cancer 06/2022    Right   • Depression    • Hyperemesis gravidarum    • Hyperlipidemia    • Hypertension    • Ovarian cyst     25 years ago   • Urinary tract infection     HX  "            Objective      /75   Pulse 60   Temp 98 °F (36.7 °C) (Infrared)   Resp 16   Ht 177.8 cm (70\")   Wt 83.9 kg (185 lb)   SpO2 98%   BMI 26.54 kg/m²   Performance Status:  ECOG score: 0           General: well appearing female in no acute distress  Neuro: alert and oriented  HEENT: sclerae anicteric, oropharynx clear  Extremities: no lower extremity edema  Skin: no rashes, lesions, bruising, or petechiae  Psych: mood and affect appropriate    Lab Results   Component Value Date    WBC 7.54 05/09/2023    HGB 14.3 05/09/2023    HCT 41.7 05/09/2023    MCV 90.1 05/09/2023     05/09/2023     Lab Results   Component Value Date    GLUCOSE 100 (H) 05/09/2023    BUN 19 05/09/2023    CREATININE 0.63 05/09/2023    EGFRIFNONA 85 05/14/2020    BCR 30.2 (H) 05/09/2023    K 4.7 05/09/2023    CO2 27.0 05/09/2023    CALCIUM 9.8 05/09/2023    ALBUMIN 5.0 12/30/2022    AST 16 12/30/2022    ALT 16 12/30/2022       CT Chest Low Dose Cancer Screening WO  Narrative: CT CHEST LOW DOSE CANCER SCREENING WO    Date of Exam: 3/20/2023 8:31 AM EDT    Indication: Tobacco abuse, 32 pack year history of smoking, history of breast cancer, screening for lung cancer..    Comparison: Chest x-ray performed on 2/21/2010.    Technique: Low dose CT imaging of the chest was performed without intravenous contrast enhancement.  Automated exposure control and iterative reconstruction methods were used.     Findings:    There are no suspicious pulmonary nodules or masses to indicate lung cancer. There is chronic scarring versus subsegmental atelectasis in the lingula. There are no layering pleural effusions. There are no enlarged hilar or mediastinal lymph nodes. There   are bilateral breast implants in place. There are no enlarged axillary lymph nodes. There are no acute findings beneath the hemidiaphragms. There are no suspicious osteolytic or sclerotic lesions within the bony structures.  Impression: Impression:   No suspicious " pulmonary nodules or masses to indicate lung cancer.    Recommendation:   Continue annual screening with LDCT    Lung Rads Assessment:   Lung-RADS L1 - Negative, <1% chance of malignancy.     Electronically Signed: Junior Sloan    3/20/2023 2:16 PM EDT    Workstation ID: [id number]            ASSESSMENT AND PLAN:     Lita Pisano is a 52 y.o. female with a diagnosis of ER/CA negative microinvasive carcinoma of the right breast.    Her breast cancer was microinvasive and hormone receptor negative.  There is no role for adjuvant systemic therapy in this situation.  She does not need any routine imaging for her low risk cancer, and does not need any breast imaging postmastectomy.    In terms of the ongoing use of menopausal hormone therapy, I do not think she is at any increased risk from a breast cancer standpoint with this treatment.  She has a ER and CA negative cancer and has had bilateral mastectomy.  She is using a IUD which hopefully would provide some endometrial protection.    No oncology follow-up is needed.  I am happy to see her in the future if additional questions arise.          A total greater than 45 mins minutes was spent in face to face patient time, examination, counseling, charting, reviewing test results, and reviewing outside records.    Sherine Acevedo MD    5/17/2023

## 2023-05-31 ENCOUNTER — TRANSCRIBE ORDERS (OUTPATIENT)
Dept: LAB | Facility: HOSPITAL | Age: 53
End: 2023-05-31

## 2023-05-31 ENCOUNTER — LAB (OUTPATIENT)
Dept: LAB | Facility: HOSPITAL | Age: 53
End: 2023-05-31

## 2023-05-31 DIAGNOSIS — Z90.13 STATUS POST BILATERAL MASTECTOMY: ICD-10-CM

## 2023-05-31 DIAGNOSIS — Z85.3 PERSONAL HISTORY OF MALIGNANT NEOPLASM OF BREAST: ICD-10-CM

## 2023-05-31 DIAGNOSIS — L03.313 CELLULITIS OF CHEST WALL: ICD-10-CM

## 2023-05-31 DIAGNOSIS — L03.313 CELLULITIS OF CHEST WALL: Primary | ICD-10-CM

## 2023-05-31 LAB
ALBUMIN SERPL-MCNC: 4.4 G/DL (ref 3.5–5.2)
ALBUMIN/GLOB SERPL: 1.6 G/DL
ALP SERPL-CCNC: 71 U/L (ref 39–117)
ALT SERPL W P-5'-P-CCNC: 14 U/L (ref 1–33)
ANION GAP SERPL CALCULATED.3IONS-SCNC: 14 MMOL/L (ref 5–15)
AST SERPL-CCNC: 14 U/L (ref 1–32)
BASOPHILS # BLD MANUAL: 0 10*3/MM3 (ref 0–0.2)
BASOPHILS NFR BLD MANUAL: 0 % (ref 0–1.5)
BILIRUB SERPL-MCNC: 0.3 MG/DL (ref 0–1.2)
BUN SERPL-MCNC: 24 MG/DL (ref 6–20)
BUN/CREAT SERPL: 34.8 (ref 7–25)
CALCIUM SPEC-SCNC: 9.4 MG/DL (ref 8.6–10.5)
CHLORIDE SERPL-SCNC: 103 MMOL/L (ref 98–107)
CK SERPL-CCNC: 18 U/L (ref 20–180)
CO2 SERPL-SCNC: 24 MMOL/L (ref 22–29)
CREAT SERPL-MCNC: 0.69 MG/DL (ref 0.57–1)
CRP SERPL-MCNC: 26.28 MG/DL (ref 0–0.5)
DEPRECATED RDW RBC AUTO: 43.1 FL (ref 37–54)
EGFRCR SERPLBLD CKD-EPI 2021: 105.2 ML/MIN/1.73
EOSINOPHIL # BLD MANUAL: 0 10*3/MM3 (ref 0–0.4)
EOSINOPHIL NFR BLD MANUAL: 0 % (ref 0.3–6.2)
ERYTHROCYTE [DISTWIDTH] IN BLOOD BY AUTOMATED COUNT: 13.2 % (ref 12.3–15.4)
ERYTHROCYTE [SEDIMENTATION RATE] IN BLOOD: 48 MM/HR (ref 0–30)
GLOBULIN UR ELPH-MCNC: 2.8 GM/DL
GLUCOSE SERPL-MCNC: 103 MG/DL (ref 65–99)
HCT VFR BLD AUTO: 37 % (ref 34–46.6)
HGB BLD-MCNC: 12.5 G/DL (ref 12–15.9)
LYMPHOCYTES # BLD MANUAL: 2.22 10*3/MM3 (ref 0.7–3.1)
LYMPHOCYTES NFR BLD MANUAL: 6 % (ref 5–12)
MCH RBC QN AUTO: 30.6 PG (ref 26.6–33)
MCHC RBC AUTO-ENTMCNC: 33.8 G/DL (ref 31.5–35.7)
MCV RBC AUTO: 90.5 FL (ref 79–97)
MONOCYTES # BLD: 1.21 10*3/MM3 (ref 0.1–0.9)
NEUTROPHILS # BLD AUTO: 16.74 10*3/MM3 (ref 1.7–7)
NEUTROPHILS NFR BLD MANUAL: 80 % (ref 42.7–76)
NEUTS BAND NFR BLD MANUAL: 3 % (ref 0–5)
PLAT MORPH BLD: NORMAL
PLATELET # BLD AUTO: 215 10*3/MM3 (ref 140–450)
PMV BLD AUTO: 9.7 FL (ref 6–12)
POTASSIUM SERPL-SCNC: 3.7 MMOL/L (ref 3.5–5.2)
PROT SERPL-MCNC: 7.2 G/DL (ref 6–8.5)
RBC # BLD AUTO: 4.09 10*6/MM3 (ref 3.77–5.28)
RBC MORPH BLD: NORMAL
SODIUM SERPL-SCNC: 141 MMOL/L (ref 136–145)
VARIANT LYMPHS NFR BLD MANUAL: 11 % (ref 19.6–45.3)
WBC MORPH BLD: NORMAL
WBC NRBC COR # BLD: 20.17 10*3/MM3 (ref 3.4–10.8)

## 2023-05-31 PROCEDURE — 85027 COMPLETE CBC AUTOMATED: CPT

## 2023-05-31 PROCEDURE — 85652 RBC SED RATE AUTOMATED: CPT

## 2023-05-31 PROCEDURE — 85007 BL SMEAR W/DIFF WBC COUNT: CPT

## 2023-05-31 PROCEDURE — 87040 BLOOD CULTURE FOR BACTERIA: CPT

## 2023-05-31 PROCEDURE — 80053 COMPREHEN METABOLIC PANEL: CPT

## 2023-05-31 PROCEDURE — 82550 ASSAY OF CK (CPK): CPT

## 2023-05-31 PROCEDURE — 36415 COLL VENOUS BLD VENIPUNCTURE: CPT

## 2023-05-31 PROCEDURE — 86140 C-REACTIVE PROTEIN: CPT

## 2023-06-05 ENCOUNTER — TRANSCRIBE ORDERS (OUTPATIENT)
Dept: LAB | Facility: HOSPITAL | Age: 53
End: 2023-06-05
Payer: COMMERCIAL

## 2023-06-05 ENCOUNTER — LAB (OUTPATIENT)
Dept: LAB | Facility: HOSPITAL | Age: 53
End: 2023-06-05
Payer: COMMERCIAL

## 2023-06-05 DIAGNOSIS — R70.0 ELEVATED SEDIMENTATION RATE: ICD-10-CM

## 2023-06-05 DIAGNOSIS — D72.829 LEUKOCYTOSIS, UNSPECIFIED TYPE: ICD-10-CM

## 2023-06-05 DIAGNOSIS — R70.0 ELEVATED SEDIMENTATION RATE: Primary | ICD-10-CM

## 2023-06-05 DIAGNOSIS — R79.82 ELEVATED C-REACTIVE PROTEIN (CRP): ICD-10-CM

## 2023-06-05 LAB
ALBUMIN SERPL-MCNC: 4.6 G/DL (ref 3.5–5.2)
ALBUMIN/GLOB SERPL: 1.6 G/DL
ALP SERPL-CCNC: 58 U/L (ref 39–117)
ALT SERPL W P-5'-P-CCNC: 15 U/L (ref 1–33)
ANION GAP SERPL CALCULATED.3IONS-SCNC: 12 MMOL/L (ref 5–15)
AST SERPL-CCNC: 15 U/L (ref 1–32)
BACTERIA SPEC AEROBE CULT: NORMAL
BACTERIA SPEC AEROBE CULT: NORMAL
BASOPHILS # BLD AUTO: 0.04 10*3/MM3 (ref 0–0.2)
BASOPHILS NFR BLD AUTO: 0.6 % (ref 0–1.5)
BILIRUB SERPL-MCNC: 0.2 MG/DL (ref 0–1.2)
BUN SERPL-MCNC: 20 MG/DL (ref 6–20)
BUN/CREAT SERPL: 32.8 (ref 7–25)
CALCIUM SPEC-SCNC: 9.8 MG/DL (ref 8.6–10.5)
CHLORIDE SERPL-SCNC: 100 MMOL/L (ref 98–107)
CK SERPL-CCNC: 28 U/L (ref 20–180)
CO2 SERPL-SCNC: 28 MMOL/L (ref 22–29)
CREAT SERPL-MCNC: 0.61 MG/DL (ref 0.57–1)
CRP SERPL-MCNC: 1.98 MG/DL (ref 0–0.5)
DEPRECATED RDW RBC AUTO: 40.7 FL (ref 37–54)
EGFRCR SERPLBLD CKD-EPI 2021: 108.4 ML/MIN/1.73
EOSINOPHIL # BLD AUTO: 0.18 10*3/MM3 (ref 0–0.4)
EOSINOPHIL NFR BLD AUTO: 2.6 % (ref 0.3–6.2)
ERYTHROCYTE [DISTWIDTH] IN BLOOD BY AUTOMATED COUNT: 12.4 % (ref 12.3–15.4)
ERYTHROCYTE [SEDIMENTATION RATE] IN BLOOD: 34 MM/HR (ref 0–30)
GLOBULIN UR ELPH-MCNC: 2.9 GM/DL
GLUCOSE SERPL-MCNC: 86 MG/DL (ref 65–99)
HCT VFR BLD AUTO: 38.9 % (ref 34–46.6)
HGB BLD-MCNC: 13 G/DL (ref 12–15.9)
IMM GRANULOCYTES # BLD AUTO: 0.03 10*3/MM3 (ref 0–0.05)
IMM GRANULOCYTES NFR BLD AUTO: 0.4 % (ref 0–0.5)
LYMPHOCYTES # BLD AUTO: 1.73 10*3/MM3 (ref 0.7–3.1)
LYMPHOCYTES NFR BLD AUTO: 24.7 % (ref 19.6–45.3)
MCH RBC QN AUTO: 30.2 PG (ref 26.6–33)
MCHC RBC AUTO-ENTMCNC: 33.4 G/DL (ref 31.5–35.7)
MCV RBC AUTO: 90.3 FL (ref 79–97)
MONOCYTES # BLD AUTO: 0.51 10*3/MM3 (ref 0.1–0.9)
MONOCYTES NFR BLD AUTO: 7.3 % (ref 5–12)
NEUTROPHILS NFR BLD AUTO: 4.51 10*3/MM3 (ref 1.7–7)
NEUTROPHILS NFR BLD AUTO: 64.4 % (ref 42.7–76)
NRBC BLD AUTO-RTO: 0 /100 WBC (ref 0–0.2)
PLATELET # BLD AUTO: 312 10*3/MM3 (ref 140–450)
PMV BLD AUTO: 9.4 FL (ref 6–12)
POTASSIUM SERPL-SCNC: 4.5 MMOL/L (ref 3.5–5.2)
PROT SERPL-MCNC: 7.5 G/DL (ref 6–8.5)
RBC # BLD AUTO: 4.31 10*6/MM3 (ref 3.77–5.28)
SODIUM SERPL-SCNC: 140 MMOL/L (ref 136–145)
WBC NRBC COR # BLD: 7 10*3/MM3 (ref 3.4–10.8)

## 2023-06-05 PROCEDURE — 86140 C-REACTIVE PROTEIN: CPT

## 2023-06-05 PROCEDURE — 85025 COMPLETE CBC W/AUTO DIFF WBC: CPT

## 2023-06-05 PROCEDURE — 82550 ASSAY OF CK (CPK): CPT

## 2023-06-05 PROCEDURE — 36415 COLL VENOUS BLD VENIPUNCTURE: CPT

## 2023-06-05 PROCEDURE — 85652 RBC SED RATE AUTOMATED: CPT

## 2023-06-05 PROCEDURE — 80053 COMPREHEN METABOLIC PANEL: CPT

## 2023-06-12 ENCOUNTER — LAB (OUTPATIENT)
Dept: LAB | Facility: HOSPITAL | Age: 53
End: 2023-06-12
Payer: COMMERCIAL

## 2023-06-12 ENCOUNTER — TRANSCRIBE ORDERS (OUTPATIENT)
Dept: LAB | Facility: HOSPITAL | Age: 53
End: 2023-06-12
Payer: COMMERCIAL

## 2023-06-12 DIAGNOSIS — D72.829 LEUKOCYTOSIS, UNSPECIFIED TYPE: ICD-10-CM

## 2023-06-12 DIAGNOSIS — R79.82 ELEVATED C-REACTIVE PROTEIN (CRP): ICD-10-CM

## 2023-06-12 DIAGNOSIS — R70.0 ELEVATED SEDIMENTATION RATE: Primary | ICD-10-CM

## 2023-06-12 DIAGNOSIS — R70.0 ELEVATED SEDIMENTATION RATE: ICD-10-CM

## 2023-06-12 LAB
ALBUMIN SERPL-MCNC: 4.8 G/DL (ref 3.5–5.2)
ALBUMIN/GLOB SERPL: 1.7 G/DL
ALP SERPL-CCNC: 65 U/L (ref 39–117)
ALT SERPL W P-5'-P-CCNC: 13 U/L (ref 1–33)
ANION GAP SERPL CALCULATED.3IONS-SCNC: 11 MMOL/L (ref 5–15)
AST SERPL-CCNC: 15 U/L (ref 1–32)
BASOPHILS # BLD AUTO: 0.03 10*3/MM3 (ref 0–0.2)
BASOPHILS NFR BLD AUTO: 0.5 % (ref 0–1.5)
BILIRUB SERPL-MCNC: 0.3 MG/DL (ref 0–1.2)
BUN SERPL-MCNC: 19 MG/DL (ref 6–20)
BUN/CREAT SERPL: 27.9 (ref 7–25)
CALCIUM SPEC-SCNC: 9.8 MG/DL (ref 8.6–10.5)
CHLORIDE SERPL-SCNC: 98 MMOL/L (ref 98–107)
CK SERPL-CCNC: 36 U/L (ref 20–180)
CO2 SERPL-SCNC: 29 MMOL/L (ref 22–29)
CREAT SERPL-MCNC: 0.68 MG/DL (ref 0.57–1)
CRP SERPL-MCNC: <0.3 MG/DL (ref 0–0.5)
DEPRECATED RDW RBC AUTO: 40.8 FL (ref 37–54)
EGFRCR SERPLBLD CKD-EPI 2021: 105.6 ML/MIN/1.73
EOSINOPHIL # BLD AUTO: 0.18 10*3/MM3 (ref 0–0.4)
EOSINOPHIL NFR BLD AUTO: 2.9 % (ref 0.3–6.2)
ERYTHROCYTE [DISTWIDTH] IN BLOOD BY AUTOMATED COUNT: 12.4 % (ref 12.3–15.4)
ERYTHROCYTE [SEDIMENTATION RATE] IN BLOOD: 13 MM/HR (ref 0–30)
GLOBULIN UR ELPH-MCNC: 2.9 GM/DL
GLUCOSE SERPL-MCNC: 92 MG/DL (ref 65–99)
HCT VFR BLD AUTO: 38.8 % (ref 34–46.6)
HGB BLD-MCNC: 13.4 G/DL (ref 12–15.9)
IMM GRANULOCYTES # BLD AUTO: 0.02 10*3/MM3 (ref 0–0.05)
IMM GRANULOCYTES NFR BLD AUTO: 0.3 % (ref 0–0.5)
LYMPHOCYTES # BLD AUTO: 1.6 10*3/MM3 (ref 0.7–3.1)
LYMPHOCYTES NFR BLD AUTO: 25.4 % (ref 19.6–45.3)
MCH RBC QN AUTO: 31.1 PG (ref 26.6–33)
MCHC RBC AUTO-ENTMCNC: 34.5 G/DL (ref 31.5–35.7)
MCV RBC AUTO: 90 FL (ref 79–97)
MONOCYTES # BLD AUTO: 0.43 10*3/MM3 (ref 0.1–0.9)
MONOCYTES NFR BLD AUTO: 6.8 % (ref 5–12)
NEUTROPHILS NFR BLD AUTO: 4.05 10*3/MM3 (ref 1.7–7)
NEUTROPHILS NFR BLD AUTO: 64.1 % (ref 42.7–76)
NRBC BLD AUTO-RTO: 0 /100 WBC (ref 0–0.2)
PLATELET # BLD AUTO: 284 10*3/MM3 (ref 140–450)
PMV BLD AUTO: 9.6 FL (ref 6–12)
POTASSIUM SERPL-SCNC: 5.3 MMOL/L (ref 3.5–5.2)
PROT SERPL-MCNC: 7.7 G/DL (ref 6–8.5)
RBC # BLD AUTO: 4.31 10*6/MM3 (ref 3.77–5.28)
SODIUM SERPL-SCNC: 138 MMOL/L (ref 136–145)
WBC NRBC COR # BLD: 6.31 10*3/MM3 (ref 3.4–10.8)

## 2023-06-12 PROCEDURE — 80053 COMPREHEN METABOLIC PANEL: CPT

## 2023-06-12 PROCEDURE — 85025 COMPLETE CBC W/AUTO DIFF WBC: CPT

## 2023-06-12 PROCEDURE — 36415 COLL VENOUS BLD VENIPUNCTURE: CPT

## 2023-06-12 PROCEDURE — 82550 ASSAY OF CK (CPK): CPT

## 2023-06-12 PROCEDURE — 86140 C-REACTIVE PROTEIN: CPT

## 2023-06-12 PROCEDURE — 85652 RBC SED RATE AUTOMATED: CPT

## 2023-09-05 RX ORDER — SODIUM PICOSULFATE, MAGNESIUM OXIDE, AND ANHYDROUS CITRIC ACID 10; 3.5; 12 MG/160ML; G/160ML; G/160ML
350 LIQUID ORAL TAKE AS DIRECTED
Qty: 350 ML | Refills: 0 | Status: SHIPPED | OUTPATIENT
Start: 2023-09-05

## 2023-09-11 ENCOUNTER — OUTSIDE FACILITY SERVICE (OUTPATIENT)
Dept: GASTROENTEROLOGY | Facility: CLINIC | Age: 53
End: 2023-09-11
Payer: COMMERCIAL

## 2023-09-11 PROCEDURE — 45390 COLONOSCOPY W/RESECTION: CPT | Performed by: INTERNAL MEDICINE

## 2023-09-11 PROCEDURE — 88305 TISSUE EXAM BY PATHOLOGIST: CPT

## 2023-09-11 PROCEDURE — 45388 COLONOSCOPY W/ABLATION: CPT | Performed by: INTERNAL MEDICINE

## 2023-09-11 PROCEDURE — 45385 COLONOSCOPY W/LESION REMOVAL: CPT | Performed by: INTERNAL MEDICINE

## 2023-09-12 ENCOUNTER — LAB REQUISITION (OUTPATIENT)
Dept: LAB | Facility: HOSPITAL | Age: 53
End: 2023-09-12
Payer: COMMERCIAL

## 2023-09-12 DIAGNOSIS — D12.0 BENIGN NEOPLASM OF CECUM: ICD-10-CM

## 2023-09-12 DIAGNOSIS — D12.7 BENIGN NEOPLASM OF RECTOSIGMOID JUNCTION: ICD-10-CM

## 2023-09-12 DIAGNOSIS — K64.8 OTHER HEMORRHOIDS: ICD-10-CM

## 2023-09-12 DIAGNOSIS — Z12.11 ENCOUNTER FOR SCREENING FOR MALIGNANT NEOPLASM OF COLON: ICD-10-CM

## 2023-09-12 DIAGNOSIS — D12.5 BENIGN NEOPLASM OF SIGMOID COLON: ICD-10-CM

## 2023-09-12 DIAGNOSIS — D12.2 BENIGN NEOPLASM OF ASCENDING COLON: ICD-10-CM

## 2023-09-13 LAB — REF LAB TEST METHOD: NORMAL

## 2023-11-07 ENCOUNTER — PRE-ADMISSION TESTING (OUTPATIENT)
Dept: PREADMISSION TESTING | Facility: HOSPITAL | Age: 53
End: 2023-11-07
Payer: COMMERCIAL

## 2023-11-07 VITALS — WEIGHT: 186.18 LBS | BODY MASS INDEX: 26.65 KG/M2 | HEIGHT: 70 IN

## 2023-11-07 LAB
ANION GAP SERPL CALCULATED.3IONS-SCNC: 10 MMOL/L (ref 5–15)
BUN SERPL-MCNC: 19 MG/DL (ref 6–20)
BUN/CREAT SERPL: 28.4 (ref 7–25)
CALCIUM SPEC-SCNC: 9.2 MG/DL (ref 8.6–10.5)
CHLORIDE SERPL-SCNC: 101 MMOL/L (ref 98–107)
CO2 SERPL-SCNC: 27 MMOL/L (ref 22–29)
CREAT SERPL-MCNC: 0.67 MG/DL (ref 0.57–1)
DEPRECATED RDW RBC AUTO: 46.9 FL (ref 37–54)
EGFRCR SERPLBLD CKD-EPI 2021: 106 ML/MIN/1.73
ERYTHROCYTE [DISTWIDTH] IN BLOOD BY AUTOMATED COUNT: 13.1 % (ref 12.3–15.4)
GLUCOSE SERPL-MCNC: 105 MG/DL (ref 65–99)
HBA1C MFR BLD: 5.2 % (ref 4.8–5.6)
HCT VFR BLD AUTO: 42.1 % (ref 34–46.6)
HGB BLD-MCNC: 13.3 G/DL (ref 12–15.9)
MCH RBC QN AUTO: 31.2 PG (ref 26.6–33)
MCHC RBC AUTO-ENTMCNC: 31.6 G/DL (ref 31.5–35.7)
MCV RBC AUTO: 98.8 FL (ref 79–97)
PLATELET # BLD AUTO: 201 10*3/MM3 (ref 140–450)
PMV BLD AUTO: 9.6 FL (ref 6–12)
POTASSIUM SERPL-SCNC: 4.1 MMOL/L (ref 3.5–5.2)
RBC # BLD AUTO: 4.26 10*6/MM3 (ref 3.77–5.28)
SODIUM SERPL-SCNC: 138 MMOL/L (ref 136–145)
WBC NRBC COR # BLD: 5.07 10*3/MM3 (ref 3.4–10.8)

## 2023-11-07 PROCEDURE — 80048 BASIC METABOLIC PNL TOTAL CA: CPT

## 2023-11-07 PROCEDURE — 83036 HEMOGLOBIN GLYCOSYLATED A1C: CPT

## 2023-11-07 PROCEDURE — 36415 COLL VENOUS BLD VENIPUNCTURE: CPT

## 2023-11-07 PROCEDURE — 93005 ELECTROCARDIOGRAM TRACING: CPT

## 2023-11-07 PROCEDURE — 85027 COMPLETE CBC AUTOMATED: CPT

## 2023-11-07 RX ORDER — MONTELUKAST SODIUM 10 MG/1
10 TABLET ORAL NIGHTLY
COMMUNITY

## 2023-11-07 NOTE — PAT
Pt had reaction to CHG weipes in the past. . READYBath LUXE wipes and instructions provided. Pt verbalized understanding.    An arrival time for procedure was not provided during PAT visit. If patient had any questions or concerns about their arrival time, they were instructed to contact their surgeon/physician.  Additionally, if the patient referred to an arrival time that was acquired from their my chart account, patient was encouraged to verify that time with their surgeon/physician. Arrival times are NOT provided in Pre Admission Testing Department.     Patient denies any current skin issues.      Patient instructed to drink 20 ounces of Gatorade or Gatorlyte (if diabetic) and it needs to be completed 1 hour (for Main OR patients) or 2 hours (scheduled  section & [facility] patients) before given arrival time for procedure (NO RED Gatorade and NO Gatorade Zero).    It was noted during Pre Admission Testing that patient was wearing some form of fingernail polish (gel/regular) and/or acrylic/artificial nails.  Patient was told that polish and/or artificial nails must be removed for surgery.  If a patient had recent manicure, and would rather not remove polish or artificial nails. Then the minimum requirement is that the polish/artificial nails must be removed from the middle finger on each hand.  Patient verbalized understanding.    If patient was having surgery on an upper extremity, then the patient was instructed that fingernail polish/artificial fingernails must be removed for surgery.  NO EXCEPTIONS.  Patient verbalized understanding.    If patient was having surgery on a lower extremity, then the patient was instructed that toenail polish on both extremities must be removed for surgery.  NO EXCEPTIONS. Patient verbalized understanding.     Patient verbalized understanding.    Script approved

## 2023-11-07 NOTE — DISCHARGE INSTRUCTIONS
Patient instructed to drink 20 ounces of Gatorade or Gatorlyte (if diabetic) and it needs to be completed 1 hour (for Main OR patients) or 2 hours ([facility]) before given arrival time for procedure (NO RED Gatorade and NO Gatorade Zero).    Patient verbalized understanding.     It was noted during Pre Admission Testing that patient was wearing some form of fingernail polish (gel/regular) and/or acrylic/artificial nails.  Patient was told that polish and/or artificial nails must be removed for surgery.  If a patient had recent manicure, and would rather not remove polish or artificial nails. Then the minimum requirement is that the polish/artificial nails must be removed from the middle finger on each hand.  Patient verbalized understanding.    If patient was having surgery on an upper extremity, then the patient was instructed that fingernail polish/artificial fingernails must be removed for surgery.  NO EXCEPTIONS.  Patient verbalized understanding.    If patient was having surgery on a lower extremity, then the patient was instructed that toenail polish on both extremities must be removed for surgery.  NO EXCEPTIONS. Patient verbalized understanding.     Patient viewed general PAT education video as instructed in their preoperative information received from their surgeon.  Patient stated the general PAT education video was viewed in its entirety and survey completed.  Copies of [facility] general education handouts (Incentive Spirometry, Meds to Beds Program, Patient Belongings, Pre-op skin preparation instructions, Blood Glucose testing, Visitor policy, Surgery FAQ, Code H) distributed to patient if not printed. Education related to the PAT pass and skin preparation for surgery (if applicable) completed in [facility] as a reinforcement to PAT education video. Patient instructed to return PAT pass provided today as well as completed skin preparation sheet (if applicable) on the day of  procedure.     Additionally if patient had not viewed video yet but intended to view it at home or in our waiting area, then referred them to the handout with QR code/link provided during PAT visit.  Instructed patient to complete survey after viewing the video in its entirety.  Encouraged patient/family to read PAT general education handouts thoroughly and notify PAT staff with any questions or concerns. Patient verbalized understanding of all information and priority content.

## 2023-11-10 LAB
QT INTERVAL: 442 MS
QTC INTERVAL: 437 MS

## 2023-11-13 ENCOUNTER — ANESTHESIA EVENT (OUTPATIENT)
Dept: PERIOP | Facility: HOSPITAL | Age: 53
End: 2023-11-13
Payer: COMMERCIAL

## 2023-11-13 RX ORDER — FAMOTIDINE 10 MG/ML
20 INJECTION, SOLUTION INTRAVENOUS ONCE
Status: CANCELLED | OUTPATIENT
Start: 2023-11-13 | End: 2023-11-13

## 2023-11-13 RX ORDER — SODIUM CHLORIDE 0.9 % (FLUSH) 0.9 %
10 SYRINGE (ML) INJECTION EVERY 12 HOURS SCHEDULED
Status: CANCELLED | OUTPATIENT
Start: 2023-11-13

## 2023-11-14 ENCOUNTER — HOSPITAL ENCOUNTER (OUTPATIENT)
Facility: HOSPITAL | Age: 53
Setting detail: HOSPITAL OUTPATIENT SURGERY
Discharge: HOME | End: 2023-11-14
Attending: PLASTIC SURGERY | Admitting: PLASTIC SURGERY
Payer: COMMERCIAL

## 2023-11-14 ENCOUNTER — ANESTHESIA (OUTPATIENT)
Dept: PERIOP | Facility: HOSPITAL | Age: 53
End: 2023-11-14
Payer: COMMERCIAL

## 2023-11-14 VITALS
HEART RATE: 75 BPM | TEMPERATURE: 98 F | RESPIRATION RATE: 15 BRPM | HEIGHT: 70 IN | WEIGHT: 186.29 LBS | BODY MASS INDEX: 26.67 KG/M2 | SYSTOLIC BLOOD PRESSURE: 109 MMHG | DIASTOLIC BLOOD PRESSURE: 76 MMHG | OXYGEN SATURATION: 94 %

## 2023-11-14 LAB
B-HCG UR QL: NEGATIVE
EXPIRATION DATE: NORMAL
INTERNAL NEGATIVE CONTROL: NORMAL
INTERNAL POSITIVE CONTROL: NORMAL
Lab: NORMAL

## 2023-11-14 PROCEDURE — 25010000002 MIDAZOLAM PER 1 MG: Performed by: ANESTHESIOLOGY

## 2023-11-14 PROCEDURE — 25010000002 ONDANSETRON PER 1 MG: Performed by: NURSE ANESTHETIST, CERTIFIED REGISTERED

## 2023-11-14 PROCEDURE — 25010000002 CEFAZOLIN PER 500 MG: Performed by: PLASTIC SURGERY

## 2023-11-14 PROCEDURE — 25010000002 FENTANYL CITRATE (PF) 50 MCG/ML SOLUTION

## 2023-11-14 PROCEDURE — 25010000002 FENTANYL CITRATE (PF) 100 MCG/2ML SOLUTION: Performed by: NURSE ANESTHETIST, CERTIFIED REGISTERED

## 2023-11-14 PROCEDURE — 25010000002 DROPERIDOL PER 5 MG

## 2023-11-14 PROCEDURE — 25010000002 HYDROMORPHONE PER 4 MG: Performed by: NURSE ANESTHETIST, CERTIFIED REGISTERED

## 2023-11-14 PROCEDURE — 25810000003 LACTATED RINGERS PER 1000 ML: Performed by: ANESTHESIOLOGY

## 2023-11-14 PROCEDURE — 25010000002 LIDOCAINE 1 % SOLUTION 20 ML VIAL: Performed by: PLASTIC SURGERY

## 2023-11-14 PROCEDURE — 81025 URINE PREGNANCY TEST: CPT | Performed by: ANESTHESIOLOGY

## 2023-11-14 PROCEDURE — 25010000002 PROPOFOL 10 MG/ML EMULSION: Performed by: NURSE ANESTHETIST, CERTIFIED REGISTERED

## 2023-11-14 PROCEDURE — 25010000002 SUGAMMADEX 200 MG/2ML SOLUTION: Performed by: NURSE ANESTHETIST, CERTIFIED REGISTERED

## 2023-11-14 PROCEDURE — 25010000002 EPINEPHRINE PER 0.1 MG: Performed by: PLASTIC SURGERY

## 2023-11-14 PROCEDURE — 25010000002 DEXAMETHASONE PER 1 MG: Performed by: NURSE ANESTHETIST, CERTIFIED REGISTERED

## 2023-11-14 PROCEDURE — 25010000002 ONDANSETRON PER 1 MG

## 2023-11-14 RX ORDER — ONDANSETRON 2 MG/ML
4 INJECTION INTRAMUSCULAR; INTRAVENOUS ONCE AS NEEDED
Status: COMPLETED | OUTPATIENT
Start: 2023-11-14 | End: 2023-11-14

## 2023-11-14 RX ORDER — FENTANYL CITRATE 50 UG/ML
INJECTION, SOLUTION INTRAMUSCULAR; INTRAVENOUS
Status: COMPLETED
Start: 2023-11-14 | End: 2023-11-14

## 2023-11-14 RX ORDER — MIDAZOLAM HYDROCHLORIDE 1 MG/ML
1 INJECTION INTRAMUSCULAR; INTRAVENOUS
Status: DISCONTINUED | OUTPATIENT
Start: 2023-11-14 | End: 2023-11-14 | Stop reason: HOSPADM

## 2023-11-14 RX ORDER — ONDANSETRON 2 MG/ML
INJECTION INTRAMUSCULAR; INTRAVENOUS
Status: COMPLETED
Start: 2023-11-14 | End: 2023-11-14

## 2023-11-14 RX ORDER — DEXAMETHASONE SODIUM PHOSPHATE 4 MG/ML
INJECTION, SOLUTION INTRA-ARTICULAR; INTRALESIONAL; INTRAMUSCULAR; INTRAVENOUS; SOFT TISSUE AS NEEDED
Status: DISCONTINUED | OUTPATIENT
Start: 2023-11-14 | End: 2023-11-14 | Stop reason: SURG

## 2023-11-14 RX ORDER — LIDOCAINE HYDROCHLORIDE 10 MG/ML
0.5 INJECTION, SOLUTION EPIDURAL; INFILTRATION; INTRACAUDAL; PERINEURAL ONCE AS NEEDED
Status: COMPLETED | OUTPATIENT
Start: 2023-11-14 | End: 2023-11-14

## 2023-11-14 RX ORDER — DROPERIDOL 2.5 MG/ML
0.62 INJECTION, SOLUTION INTRAMUSCULAR; INTRAVENOUS ONCE AS NEEDED
Status: COMPLETED | OUTPATIENT
Start: 2023-11-14 | End: 2023-11-14

## 2023-11-14 RX ORDER — ROCURONIUM BROMIDE 10 MG/ML
INJECTION, SOLUTION INTRAVENOUS AS NEEDED
Status: DISCONTINUED | OUTPATIENT
Start: 2023-11-14 | End: 2023-11-14 | Stop reason: SURG

## 2023-11-14 RX ORDER — LIDOCAINE HYDROCHLORIDE 10 MG/ML
INJECTION, SOLUTION EPIDURAL; INFILTRATION; INTRACAUDAL; PERINEURAL AS NEEDED
Status: DISCONTINUED | OUTPATIENT
Start: 2023-11-14 | End: 2023-11-14 | Stop reason: SURG

## 2023-11-14 RX ORDER — PROPOFOL 10 MG/ML
VIAL (ML) INTRAVENOUS AS NEEDED
Status: DISCONTINUED | OUTPATIENT
Start: 2023-11-14 | End: 2023-11-14 | Stop reason: SURG

## 2023-11-14 RX ORDER — SODIUM CHLORIDE 9 MG/ML
40 INJECTION, SOLUTION INTRAVENOUS AS NEEDED
Status: DISCONTINUED | OUTPATIENT
Start: 2023-11-14 | End: 2023-11-14 | Stop reason: HOSPADM

## 2023-11-14 RX ORDER — FAMOTIDINE 20 MG/1
20 TABLET, FILM COATED ORAL ONCE
Status: COMPLETED | OUTPATIENT
Start: 2023-11-14 | End: 2023-11-14

## 2023-11-14 RX ORDER — SODIUM CHLORIDE 0.9 % (FLUSH) 0.9 %
10 SYRINGE (ML) INJECTION AS NEEDED
Status: DISCONTINUED | OUTPATIENT
Start: 2023-11-14 | End: 2023-11-14 | Stop reason: HOSPADM

## 2023-11-14 RX ORDER — SODIUM CHLORIDE, SODIUM LACTATE, POTASSIUM CHLORIDE, AND CALCIUM CHLORIDE .6; .31; .03; .02 G/100ML; G/100ML; G/100ML; G/100ML
INJECTION, SOLUTION INTRAVENOUS AS NEEDED
Status: DISCONTINUED | OUTPATIENT
Start: 2023-11-14 | End: 2023-11-14 | Stop reason: HOSPADM

## 2023-11-14 RX ORDER — IPRATROPIUM BROMIDE AND ALBUTEROL SULFATE 2.5; .5 MG/3ML; MG/3ML
3 SOLUTION RESPIRATORY (INHALATION) ONCE AS NEEDED
Status: DISCONTINUED | OUTPATIENT
Start: 2023-11-14 | End: 2023-11-14 | Stop reason: HOSPADM

## 2023-11-14 RX ORDER — ONDANSETRON 2 MG/ML
INJECTION INTRAMUSCULAR; INTRAVENOUS AS NEEDED
Status: DISCONTINUED | OUTPATIENT
Start: 2023-11-14 | End: 2023-11-14 | Stop reason: SURG

## 2023-11-14 RX ORDER — SODIUM CHLORIDE, SODIUM LACTATE, POTASSIUM CHLORIDE, CALCIUM CHLORIDE 600; 310; 30; 20 MG/100ML; MG/100ML; MG/100ML; MG/100ML
9 INJECTION, SOLUTION INTRAVENOUS CONTINUOUS
Status: DISCONTINUED | OUTPATIENT
Start: 2023-11-14 | End: 2023-11-14 | Stop reason: HOSPADM

## 2023-11-14 RX ORDER — DROPERIDOL 2.5 MG/ML
INJECTION, SOLUTION INTRAMUSCULAR; INTRAVENOUS
Status: COMPLETED
Start: 2023-11-14 | End: 2023-11-14

## 2023-11-14 RX ORDER — SCOLOPAMINE TRANSDERMAL SYSTEM 1 MG/1
1 PATCH, EXTENDED RELEASE TRANSDERMAL ONCE
Status: COMPLETED | OUTPATIENT
Start: 2023-11-14 | End: 2023-11-14

## 2023-11-14 RX ORDER — MAGNESIUM HYDROXIDE 1200 MG/15ML
LIQUID ORAL AS NEEDED
Status: DISCONTINUED | OUTPATIENT
Start: 2023-11-14 | End: 2023-11-14 | Stop reason: HOSPADM

## 2023-11-14 RX ORDER — HYDROMORPHONE HYDROCHLORIDE 1 MG/ML
0.5 INJECTION, SOLUTION INTRAMUSCULAR; INTRAVENOUS; SUBCUTANEOUS
Status: DISCONTINUED | OUTPATIENT
Start: 2023-11-14 | End: 2023-11-14 | Stop reason: HOSPADM

## 2023-11-14 RX ORDER — FENTANYL CITRATE 50 UG/ML
INJECTION, SOLUTION INTRAMUSCULAR; INTRAVENOUS AS NEEDED
Status: DISCONTINUED | OUTPATIENT
Start: 2023-11-14 | End: 2023-11-14 | Stop reason: SURG

## 2023-11-14 RX ORDER — FENTANYL CITRATE 50 UG/ML
50 INJECTION, SOLUTION INTRAMUSCULAR; INTRAVENOUS
Status: DISCONTINUED | OUTPATIENT
Start: 2023-11-14 | End: 2023-11-14 | Stop reason: HOSPADM

## 2023-11-14 RX ADMIN — FENTANYL CITRATE 100 MCG: 50 INJECTION, SOLUTION INTRAMUSCULAR; INTRAVENOUS at 14:41

## 2023-11-14 RX ADMIN — SODIUM CHLORIDE, POTASSIUM CHLORIDE, SODIUM LACTATE AND CALCIUM CHLORIDE 9 ML/HR: 600; 310; 30; 20 INJECTION, SOLUTION INTRAVENOUS at 12:45

## 2023-11-14 RX ADMIN — SUGAMMADEX 200 MG: 100 INJECTION, SOLUTION INTRAVENOUS at 16:12

## 2023-11-14 RX ADMIN — DROPERIDOL 0.62 MG: 2.5 INJECTION, SOLUTION INTRAMUSCULAR; INTRAVENOUS at 16:37

## 2023-11-14 RX ADMIN — ROCURONIUM BROMIDE 50 MG: 10 SOLUTION INTRAVENOUS at 14:41

## 2023-11-14 RX ADMIN — SODIUM CHLORIDE 2000 MG: 900 INJECTION INTRAVENOUS at 14:45

## 2023-11-14 RX ADMIN — DEXAMETHASONE SODIUM PHOSPHATE 8 MG: 4 INJECTION, SOLUTION INTRAMUSCULAR; INTRAVENOUS at 14:45

## 2023-11-14 RX ADMIN — LIDOCAINE HYDROCHLORIDE 0.5 ML: 10 INJECTION, SOLUTION EPIDURAL; INFILTRATION; INTRACAUDAL; PERINEURAL at 12:46

## 2023-11-14 RX ADMIN — SCOPALAMINE 1 PATCH: 1 PATCH, EXTENDED RELEASE TRANSDERMAL at 14:26

## 2023-11-14 RX ADMIN — MIDAZOLAM HYDROCHLORIDE 2 MG: 1 INJECTION, SOLUTION INTRAMUSCULAR; INTRAVENOUS at 14:26

## 2023-11-14 RX ADMIN — LIDOCAINE HYDROCHLORIDE 50 MG: 10 INJECTION, SOLUTION EPIDURAL; INFILTRATION; INTRACAUDAL; PERINEURAL at 14:41

## 2023-11-14 RX ADMIN — HYDROMORPHONE HYDROCHLORIDE 0.5 MG: 1 INJECTION, SOLUTION INTRAMUSCULAR; INTRAVENOUS; SUBCUTANEOUS at 16:33

## 2023-11-14 RX ADMIN — ONDANSETRON 4 MG: 2 INJECTION INTRAMUSCULAR; INTRAVENOUS at 16:12

## 2023-11-14 RX ADMIN — SODIUM CHLORIDE, POTASSIUM CHLORIDE, SODIUM LACTATE AND CALCIUM CHLORIDE: 600; 310; 30; 20 INJECTION, SOLUTION INTRAVENOUS at 15:26

## 2023-11-14 RX ADMIN — PROPOFOL 25 MCG/KG/MIN: 10 INJECTION, EMULSION INTRAVENOUS at 14:46

## 2023-11-14 RX ADMIN — FENTANYL CITRATE 50 MCG: 50 INJECTION, SOLUTION INTRAMUSCULAR; INTRAVENOUS at 16:48

## 2023-11-14 RX ADMIN — ONDANSETRON 4 MG: 2 INJECTION INTRAMUSCULAR; INTRAVENOUS at 18:07

## 2023-11-14 RX ADMIN — PROPOFOL 200 MG: 10 INJECTION, EMULSION INTRAVENOUS at 14:41

## 2023-11-14 RX ADMIN — FAMOTIDINE 20 MG: 20 TABLET, FILM COATED ORAL at 12:46

## 2023-11-14 NOTE — ANESTHESIA PREPROCEDURE EVALUATION
Anesthesia Evaluation     Patient summary reviewed and Nursing notes reviewed   no history of anesthetic complications:   NPO Solid Status: > 8 hours  NPO Liquid Status: > 2 hours           Airway   Mallampati: II  TM distance: >3 FB  Neck ROM: full  No difficulty expected  Dental      Pulmonary     breath sounds clear to auscultation  (+) a smoker Former,  Cardiovascular     ECG reviewed  Rhythm: regular  Rate: normal    (+) hypertension, hyperlipidemia  (-) angina, SIM      Neuro/Psych  (+) psychiatric history Anxiety and Depression  GI/Hepatic/Renal/Endo - negative ROS     Musculoskeletal     Abdominal    Substance History      OB/GYN          Other      history of cancer                  Anesthesia Plan    ASA 3     general     (Scop patch)  intravenous induction     Anesthetic plan, risks, benefits, and alternatives have been provided, discussed and informed consent has been obtained with: patient.    Plan discussed with CRNA.      CODE STATUS:

## 2023-11-14 NOTE — OP NOTE
DATE OF PROCEDURE: 11/14/23    PREOPERATIVE DIAGNOSIS:   1. History of breast cancer  2. Bilateral absent breasts  3. Bilateral reconstructed breast asymmetry.     POSTOPERATIVE DIAGNOSIS:   1. History of breast cancer  2. Bilateral absent breasts  3. Bilateral reconstructed breast asymmetry.     PROCEDURES PERFORMED: Revision of reconstructed breasts with fat grafting.     SURGEON: Emiliano Ackerman MD    ASSISTANT:   None     Circulator: Steve Acosta RN; Reji Medina RN  Scrub Person: Waleska Castillo; Rj Pepper; Samantha Faith     ANESTHESIA: General.    INDICATIONS: The patient is a 53-year-old female who had a history of breast cancer for which she underwent bilateral mastectomies, as well as tissue expander placement. She subsequently underwent expansion with exchange of expanders to permanent implants. She continued to have asymmetry with her reconstructed breasts, especially in the superior and medial poles, and therefore presented today for fat grafting of those poles. The patient understood all of the risks and benefits of the procedure, including fat necrosis, oil cysts, need for future procedures, and elected to proceed.      FINDINGS: Placement of 112 mL of fat to the right reconstructed breast superior and medial poles. Placement of 147 mL of fat to the left breast.     DESCRIPTION OF PROCEDURE: The patient was seen in preoperative holding, marked in a standing position and taken to the operating room by anesthesia after informed consent was signed and on the chart. The patient was placed supine on the operating room table and general anesthesia was induced. Once verified, the case was then turned over to the surgical service. The patient had her chest and abdomen prepped and draped in a normal sterile fashion. A timeout was called and all parties were in agreement, including nursing and anesthesia. Preoperative antibiotics were given. We began by making 2 small poke holes, stab  incisions with a #15 blade scalpel in the lower right and left quadrants for which we infiltrated our tumescent solution which consisted of a liter of lactated ringer and 30 mL of 1% plain Lidocaine and an ampule of 1:1000 epinephrine. This was infiltrated to the abdomen. After allowing sufficient time for this to work, we began with liposuction of her entire abdomen. We infiltrated approximately 900cc of tumescent solution and we removed approximately 900 mL of lipoaspirate. The fat was then washed and spun in the Lumus System, and we began by fat grafting after making a small stab incision on the #15 blade scalpel to her left and right reconstructed breasts. We fat grafted to the superior and medial poles of both reconstructed breasts. At this point, she was sat upright and noted to have better improved symmetry between both breasts, and laid back down. The small incisions were then closed with 4-0 nylon in a simple interrupted fashion. She was then extubated, had Kerlix fluffs and a surgical bra, as well as abdominal binder placed on her, awoken and taken to PACU in stable condition. All sponge and instrument counts were correct. I, Dr. Emiliano Ackerman, was present and scrubbed for the entire procedure.      SPECIMEN: None     ESTIMATED BLOOD LOSS: 10cc.     DRAINS: None.      COMPLICATIONS: None immediate.     Emiliano Ackerman MD  11/14/23  16:31 EST

## 2023-11-14 NOTE — BRIEF OP NOTE
FAT GRAFTING  Progress Note    Lita Pisano  11/14/2023    Pre-op Diagnosis:   1. History of breast cancer  2. Bilateral absent breasts  3. Bilateral reconstructed breast asymmetry.        Post-Op Diagnosis Codes:   Same     Procedure/CPT® Codes:        Procedure(s):  BREAST FAT GRAFTING BILATERAL              Surgeon(s):  Emiliano Ackerman MD    Anesthesia: General    Staff:   Circulator: Steve Acosta RN; Reji Medina RN  Scrub Person: Waleska Castillo; Rj Pepper; Samantha Faith         Estimated Blood Loss: minimal    Urine Voided: * No values recorded between 11/14/2023  2:38 PM and 11/14/2023  4:28 PM *    Specimens:                None          Drains: * No LDAs found *    Findings: see op note        Complications: none immediate           Emiliano Ackerman MD     Date: 11/14/2023  Time: 16:30 EST

## 2023-11-14 NOTE — ANESTHESIA PROCEDURE NOTES
Airway  Urgency: elective    Date/Time: 11/14/2023 2:43 PM  Airway not difficult    General Information and Staff    Patient location during procedure: OR  CRNA/CAA: Bryon Hurtado CRNA    Indications and Patient Condition  Indications for airway management: airway protection    Preoxygenated: yes  MILS not maintained throughout  Mask difficulty assessment: 1 - vent by mask    Final Airway Details  Final airway type: endotracheal airway      Successful airway: ETT  Cuffed: yes   Successful intubation technique: direct laryngoscopy  Facilitating devices/methods: intubating stylet  Endotracheal tube insertion site: oral  Blade: Andrews  Blade size: 2  ETT size (mm): 6.5  Cormack-Lehane Classification: grade I - full view of glottis  Placement verified by: chest auscultation and capnometry   Cuff volume (mL): 5  Measured from: lips  ETT/EBT  to lips (cm): 20  Number of attempts at approach: 1  Assessment: lips, teeth, and gum same as pre-op and atraumatic intubation    Additional Comments  Negative epigastric sounds, Breath sound equal bilaterally with symmetric chest rise and fall

## 2023-11-14 NOTE — H&P
Pre-Op H&P  Lita Pisano  [id number]  1970      Chief complaint: Hx breast cancer      Subjective:  Patient is a 53 y.o.female presents for scheduled surgery by Dr. Ackerman. She anticipates a BREAST FAT GRAFTING BILATERAL  today. 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 On 6/28/22, she underwent right breast skin sparing mastectomy with sentinel node bx. On 1/10/23, she underwent excision of right breast implant, prophylactic left breast mastectomy and tissue expander on the left. She had bilateral breast tissue expander exchange to permanent implants.       Review of Systems:  Constitutional-- No fever, chills or sweats. No fatigue.  CV-- No chest pain, palpitation or syncope. +HTN, HLD  Resp-- No SOB, cough, hemoptysis  Skin--No rashes or lesions      Allergies:   Allergies   Allergen Reactions    Hibiclens [Chlorhexidine Gluconate] Dermatitis     Burn and itching and red     Sulfa Antibiotics Rash         Home Meds:  Medications Prior to Admission   Medication Sig Dispense Refill Last Dose    atorvastatin (LIPITOR) 10 MG tablet Take 1 tablet by mouth Daily.   11/13/2023    Barberry-Oreg Grape-Goldenseal (BERBERINE COMPLEX PO) Take 1 capsule by mouth Daily.   11/13/2023    benzoyl peroxide 10 % external wash Apply 1 application  topically to the appropriate area as directed Daily.   11/14/2023    cetirizine (zyrTEC) 10 MG tablet Take 1 tablet by mouth Daily.   11/14/2023 at 0900    clindamycin (CLEOCIN T) 1 % lotion Apply 1 application  topically to the appropriate area as directed 2 (Two) Times a Day.   Past Week    estradiol (MINIVELLE, VIVELLE-DOT) 0.05 MG/24HR patch Place 1 patch on the skin as directed by provider 2 (Two) Times a Week. EVERY THIRD DAY   Past Week    hydroCHLOROthiazide (MICROZIDE) 12.5 MG capsule Take 1 capsule by mouth Every Other Day.   Past Week     levonorgestrel (MIRENA) 20 MCG/24HR IUD 1 each by Intrauterine route 1 (One) Time.   11/13/2023    montelukast (SINGULAIR) 10 MG tablet Take 1 tablet by mouth Every Night.   11/13/2023    Multiple Vitamins-Minerals (MULTIVITAMIN WITH MINERALS) tablet tablet Take 1 tablet by mouth Daily.   11/13/2023    nebivolol (BYSTOLIC) 10 MG tablet Take 0.5 tablets by mouth Every Morning.   11/14/2023    venlafaxine XR (Effexor XR) 75 MG 24 hr capsule Take 1 capsule by mouth Daily. 90 capsule 4 11/14/2023 at 0900    vitamin E 400 Units capsule Take 1 capsule by mouth Daily.   Past Week    docusate sodium (COLACE) 100 MG capsule Take 1 capsule by mouth 2 (Two) Times a Day As Needed.       ondansetron ODT (ZOFRAN-ODT) 4 MG disintegrating tablet DISSOLVE 1 TABLET IN MOUTH EVERY 6 HOURS AS NEEDED   More than a month    oxyCODONE (ROXICODONE) 5 MG immediate release tablet Take 1 tablet by mouth 6 (Six) Times a Day.   More than a month         PMH:   Past Medical History:   Diagnosis Date    Abnormal Pap smear of cervix     I think 2-3 years ago    Anxiety     Breast cancer 06/2022    Right    Depression     Hyperemesis gravidarum     Hyperlipidemia     Hypertension     Ovarian cyst     25 years ago    Urinary tract infection     HX    Wears reading eyeglasses      PSH:    Past Surgical History:   Procedure Laterality Date    AUGMENTATION MAMMAPLASTY  2003    saline, retro    BREAST AUGMENTATION Bilateral 09/11/2019    saline removed and replaced with Gel Dr Kaye    BREAST BIOPSY  June 2022    BREAST RECONSTRUCTION N/A 01/10/2023    Procedure: BREAST RECONSTRUCTION RIGHT, DELAYED WITH LEFT IMMEDIATE RECONSTRUCTION W/ TISSUE EXPANDER AND ALLODERM INSERTION;  Surgeon: Emiliano Ackerman MD;  Location: [facility];  Service: Plastics;  Laterality: N/A;    BREAST RECONSTRUCTION, BREAST TISSUE EXPANDER REMOVAL, IMPLANT INSERTION Bilateral 05/16/2023    Procedure: BREAST TISSUE EXPANDERS EXCHANGE TO PERMANENT IMPLANTS BILATERAL;   "Surgeon: Emiilano Ackerman MD;  Location:  [facility];  Service: Plastics;  Laterality: Bilateral;    CHOLECYSTECTOMY      COLONOSCOPY      DENTAL PROCEDURE      Dental implant    MASTECTOMY Right 01/10/2023    Procedure: EXCISION OF RIGHT BREAST IMPLANT capsule, LEFT PROPHYLACTIC MASTECTOMY;  Surgeon: Sterling Yee MD;  Location:  [facility];  Service: General;  Laterality: Right;    MASTECTOMY COMPLETE / SIMPLE Left 01/10/2023    prophylactic    MASTECTOMY W/ SENTINEL NODE BIOPSY Right 2022    Procedure: BREAST SKIN SPARING  MASTECTOMY WITH SENTINEL NODE BIOPSY- RIGHT;  Surgeon: Sterling Yee MD;  Location:  [facility];  Service: General;  Laterality: Right;    SKIN BIOPSY         Immunization History:  Influenza: UTD  Pneumococcal: No  Tetanus: UTD  Covid : x2    Social History:   Tobacco:   Social History     Tobacco Use   Smoking Status Former    Packs/day: 0.50    Years: 32.00    Additional pack years: 0.00    Total pack years: 16.00    Types: Cigarettes    Quit date: 2022    Years since quittin.3    Passive exposure: Past   Smokeless Tobacco Never      Alcohol:     Social History     Substance and Sexual Activity   Alcohol Use Yes    Alcohol/week: 1.0 standard drink of alcohol    Types: 1 Glasses of wine per week    Comment: SOCIAL         Physical Exam:/79 (BP Location: Right arm, Patient Position: Lying)   Pulse 59   Temp 97 °F (36.1 °C) (Temporal)   Resp 18   Ht 177.8 cm (70\")   Wt 84.5 kg (186 lb 4.6 oz)   SpO2 98%   BMI 26.73 kg/m²       General Appearance:    Alert, cooperative, no distress, appears stated age   Head:    Normocephalic, without obvious abnormality, atraumatic   Lungs:     Clear to auscultation bilaterally, respirations unlabored    Heart:   Regular rate and rhythm, S1 and S2 normal    Abdomen:    Soft without tenderness   Extremities:   Extremities normal, atraumatic, no cyanosis or edema   Skin:   Skin color, texture, turgor normal, no rashes " or lesions   Neurologic:   Grossly intact     Results Review:     LABS:  Lab Results   Component Value Date    WBC 5.07 11/07/2023    HGB 13.3 11/07/2023    HCT 42.1 11/07/2023    MCV 98.8 (H) 11/07/2023     11/07/2023    NEUTROABS 3.24 06/20/2023    GLUCOSE 105 (H) 11/07/2023    BUN 19 11/07/2023    CREATININE 0.67 11/07/2023    EGFRIFNONA 85 05/14/2020     11/07/2023    K 4.1 11/07/2023     11/07/2023    CO2 27.0 11/07/2023    CALCIUM 9.2 11/07/2023    ALBUMIN 4.7 06/20/2023    AST 15 06/20/2023    ALT 11 06/20/2023    BILITOT 0.4 06/20/2023       RADIOLOGY:  Imaging Results (Last 72 Hours)       ** No results found for the last 72 hours. **            I reviewed the patient's new clinical results.    Cancer Staging (if applicable)  Cancer Patient: __ yes __no __unknown; If yes, clinical stage T:__ N:__M:__, stage group or __N/A      Impression: Stage I (T1, N0, M0) DCIS and IDC right breast; ER/RI-       Plan: BREAST FAT GRAFTING BILATERAL       DEVON Paris   11/14/2023   12:57 EST

## 2023-11-14 NOTE — ANESTHESIA POSTPROCEDURE EVALUATION
Patient: Lita Pisano    Procedure Summary       Date: 11/14/23 Room / Location:  [facility] / [facility]    Anesthesia Start: 1437 Anesthesia Stop: 1633    Procedure: BREAST FAT GRAFTING BILATERAL (Bilateral) Diagnosis:     Surgeons: Emiliano Ackerman MD Provider: Bryon Kaye MD    Anesthesia Type: general ASA Status: 3            Anesthesia Type: general    Vitals  Vitals Value Taken Time   /83 11/14/23 1631   Temp     Pulse 90 11/14/23 1633   Resp     SpO2 99 % 11/14/23 1633   Vitals shown include unfiled device data.        Post Anesthesia Care and Evaluation    Patient location during evaluation: PACU  Patient participation: complete - patient participated  Level of consciousness: awake and alert  Pain management: adequate    Airway patency: patent  Anesthetic complications: No anesthetic complications  PONV Status: none  Cardiovascular status: hemodynamically stable and acceptable  Respiratory status: nonlabored ventilation, acceptable and room air  Hydration status: acceptable

## 2024-04-23 ENCOUNTER — TRANSCRIBE ORDERS (OUTPATIENT)
Dept: ADMINISTRATIVE | Facility: HOSPITAL | Age: 54
End: 2024-04-23
Payer: COMMERCIAL

## 2024-04-23 DIAGNOSIS — Z87.891 PERSONAL HISTORY OF TOBACCO USE, PRESENTING HAZARDS TO HEALTH: Primary | ICD-10-CM

## 2024-04-30 ENCOUNTER — OFFICE VISIT (OUTPATIENT)
Dept: NEUROSURGERY | Facility: CLINIC | Age: 54
End: 2024-04-30
Payer: COMMERCIAL

## 2024-04-30 ENCOUNTER — HOSPITAL ENCOUNTER (OUTPATIENT)
Dept: CT IMAGING | Facility: HOSPITAL | Age: 54
Discharge: HOME | End: 2024-04-30
Admitting: PHYSICIAN ASSISTANT
Payer: COMMERCIAL

## 2024-04-30 VITALS
BODY MASS INDEX: 27.2 KG/M2 | DIASTOLIC BLOOD PRESSURE: 94 MMHG | HEIGHT: 70 IN | WEIGHT: 190 LBS | SYSTOLIC BLOOD PRESSURE: 128 MMHG

## 2024-04-30 DIAGNOSIS — H53.8 BLURRY VISION, BILATERAL: ICD-10-CM

## 2024-04-30 DIAGNOSIS — R51.9 ACUTE INTRACTABLE HEADACHE, UNSPECIFIED HEADACHE TYPE: ICD-10-CM

## 2024-04-30 DIAGNOSIS — R29.1 NUCHAL RIGIDITY: ICD-10-CM

## 2024-04-30 DIAGNOSIS — R51.9 ACUTE INTRACTABLE HEADACHE, UNSPECIFIED HEADACHE TYPE: Primary | ICD-10-CM

## 2024-04-30 DIAGNOSIS — R11.0 MODERATE NAUSEA: ICD-10-CM

## 2024-04-30 PROBLEM — G44.53 HEADACHE, PRIMARY THUNDERCLAP: Status: ACTIVE | Noted: 2024-04-30

## 2024-04-30 LAB — CREAT BLDA-MCNC: 0.8 MG/DL (ref 0.6–1.3)

## 2024-04-30 PROCEDURE — 99204 OFFICE O/P NEW MOD 45 MIN: CPT | Performed by: PHYSICIAN ASSISTANT

## 2024-04-30 PROCEDURE — 82565 ASSAY OF CREATININE: CPT

## 2024-04-30 PROCEDURE — 70496 CT ANGIOGRAPHY HEAD: CPT

## 2024-04-30 PROCEDURE — 25510000001 IOPAMIDOL PER 1 ML: Performed by: PHYSICIAN ASSISTANT

## 2024-04-30 RX ORDER — ASPIRIN 81 MG/1
81 TABLET, CHEWABLE ORAL DAILY
COMMUNITY

## 2024-04-30 RX ORDER — NEBIVOLOL 5 MG/1
5 TABLET ORAL DAILY
COMMUNITY
Start: 2024-04-23

## 2024-04-30 RX ORDER — CLONIDINE HYDROCHLORIDE 0.1 MG/1
0.1 TABLET ORAL
COMMUNITY
Start: 2024-03-05 | End: 2025-03-05

## 2024-04-30 RX ORDER — LORAZEPAM 0.5 MG/1
TABLET ORAL
COMMUNITY
Start: 2024-03-05

## 2024-04-30 RX ORDER — VENLAFAXINE HYDROCHLORIDE 150 MG/1
150 CAPSULE, EXTENDED RELEASE ORAL DAILY
COMMUNITY
Start: 2024-04-23 | End: 2025-04-23

## 2024-04-30 RX ADMIN — IOPAMIDOL 75 ML: 755 INJECTION, SOLUTION INTRAVENOUS at 12:07

## 2024-04-30 NOTE — PROGRESS NOTES
Patient: Lita Pisano  : 1970  Chart #: [id number]    Date of Service: 2024    Chief Complaint   Patient presents with    Headache/nausea     HPI  This 52 yo WF developed a sudden headache while in the shower. Severe pain was in both sides of her head, throbbing, associated blurry vision and nausea. Thunderclap in nature.    Chronic Illnesses:  Past Medical History:   Diagnosis Date    Abnormal Pap smear of cervix     I think 2-3 years ago    Anxiety     Breast cancer 2022    Right    Depression     Headache     Hyperemesis gravidarum     Hyperlipidemia     Hypertension     Ovarian cyst     25 years ago    Urinary tract infection     HX    Wears reading eyeglasses          Past Surgical History:   Procedure Laterality Date    AUGMENTATION MAMMAPLASTY      saline, retro    BREAST AUGMENTATION Bilateral 2019    saline removed and replaced with Gel Dr Kaye    BREAST BIOPSY  2022    BREAST RECONSTRUCTION N/A 01/10/2023    Procedure: BREAST RECONSTRUCTION RIGHT, DELAYED WITH LEFT IMMEDIATE RECONSTRUCTION W/ TISSUE EXPANDER AND ALLODERM INSERTION;  Surgeon: Emiliano Ackerman MD;  Location:  [facility];  Service: Plastics;  Laterality: N/A;    BREAST RECONSTRUCTION, BREAST TISSUE EXPANDER REMOVAL, IMPLANT INSERTION Bilateral 2023    Procedure: BREAST TISSUE EXPANDERS EXCHANGE TO PERMANENT IMPLANTS BILATERAL;  Surgeon: Emiliano Ackerman MD;  Location:  [facility];  Service: Plastics;  Laterality: Bilateral;    CHOLECYSTECTOMY      COLONOSCOPY      DENTAL PROCEDURE      Dental implant    FAT GRAFTING Bilateral 2023    Procedure: BREAST FAT GRAFTING BILATERAL;  Surgeon: Emiliano Ackerman MD;  Location:  [facility];  Service: Plastics;  Laterality: Bilateral;    MASTECTOMY Right 01/10/2023    Procedure: EXCISION OF RIGHT BREAST IMPLANT capsule, LEFT PROPHYLACTIC MASTECTOMY;  Surgeon: Sterling Yee MD;  Location:  [facility];  Service: General;  Laterality: Right;     MASTECTOMY COMPLETE / SIMPLE Left 01/10/2023    prophylactic    MASTECTOMY W/ SENTINEL NODE BIOPSY Right 06/28/2022    Procedure: BREAST SKIN SPARING  MASTECTOMY WITH SENTINEL NODE BIOPSY- RIGHT;  Surgeon: Sterling Yee MD;  Location: [facility];  Service: General;  Laterality: Right;    SKIN BIOPSY         Allergies   Allergen Reactions    Sulfa Antibiotics Rash    Hibiclens [Chlorhexidine Gluconate] Dermatitis     Burn and itching and red          Current Outpatient Medications:     aspirin 81 MG chewable tablet, Chew 1 tablet Daily., Disp: , Rfl:     atorvastatin (LIPITOR) 10 MG tablet, Take 1 tablet by mouth Daily., Disp: , Rfl:     Barberry-Oreg Grape-Goldenseal (BERBERINE COMPLEX PO), Take 1 capsule by mouth Daily., Disp: , Rfl:     cetirizine (zyrTEC) 10 MG tablet, Take 1 tablet by mouth Daily., Disp: , Rfl:     cloNIDine (CATAPRES) 0.1 MG tablet, Take 1 tablet by mouth., Disp: , Rfl:     estradiol (MINIVELLE, VIVELLE-DOT) 0.05 MG/24HR patch, Place 1 patch on the skin as directed by provider 2 (Two) Times a Week. EVERY THIRD DAY, Disp: , Rfl:     hydroCHLOROthiazide (MICROZIDE) 12.5 MG capsule, Take 1 capsule by mouth Every Other Day., Disp: , Rfl:     levonorgestrel (MIRENA) 20 MCG/24HR IUD, 1 each by Intrauterine route 1 (One) Time., Disp: , Rfl:     LORazepam (ATIVAN) 0.5 MG tablet, , Disp: , Rfl:     montelukast (SINGULAIR) 10 MG tablet, Take 1 tablet by mouth Every Night., Disp: , Rfl:     Multiple Vitamins-Minerals (MULTIVITAMIN WITH MINERALS) tablet tablet, Take 1 tablet by mouth Daily., Disp: , Rfl:     nebivolol (BYSTOLIC) 5 MG tablet, Take 1 tablet by mouth Daily., Disp: , Rfl:     ondansetron ODT (ZOFRAN-ODT) 4 MG disintegrating tablet, DISSOLVE 1 TABLET IN MOUTH EVERY 6 HOURS AS NEEDED, Disp: , Rfl:     venlafaxine XR (EFFEXOR-XR) 150 MG 24 hr capsule, Take 1 capsule by mouth Daily., Disp: , Rfl:     vitamin E 400 Units capsule, Take 1 capsule by mouth Daily., Disp: , Rfl:   No current  "facility-administered medications for this visit.    Facility-Administered Medications Ordered in Other Visits:     iopamidol (ISOVUE-370) 76 % injection 100 mL, 100 mL, Intravenous, Once in imaging, Beena Glover PA-C    Social History     Socioeconomic History    Marital status:    Tobacco Use    Smoking status: Former     Current packs/day: 0.00     Average packs/day: 0.5 packs/day for 32.0 years (16.0 ttl pk-yrs)     Types: Cigarettes     Start date: 1990     Quit date: 2022     Years since quittin.8     Passive exposure: Past    Smokeless tobacco: Never   Vaping Use    Vaping status: Never Used   Substance and Sexual Activity    Alcohol use: Yes     Alcohol/week: 1.0 standard drink of alcohol     Types: 1 Glasses of wine per week     Comment: SOCIAL    Drug use: No    Sexual activity: Defer       Family History   Problem Relation Age of Onset    Prostate cancer Father     Stroke Father     Hypertension Father     Osteoporosis Mother     Dementia Mother     Hypertension Paternal Grandfather     Hypertension Paternal Grandmother     Hypertension Maternal Grandmother     Hypertension Maternal Grandfather     Breast cancer Neg Hx     Ovarian cancer Neg Hx        BMI is >= 25 and <30. (Overweight) The following options were offered after discussion;: referral to primary care       Social History    Tobacco Use      Smoking status: Former        Packs/day: 0.00        Years: 0.5 packs/day for 32.0 years (16.0 ttl pk-yrs)        Types: Cigarettes        Start date: 1990        Quit date: 2022        Years since quittin.8        Passive exposure: Past      Smokeless tobacco: Never       Review of Systems   Gastrointestinal:  Positive for nausea.   Neurological:  Positive for headaches.   All other systems reviewed and are negative.         Physical examination:  Blood pressure 128/94, height 177.8 cm (70\"), weight 86.2 kg (190 lb).  HEENT- normocephalic, atraumatic, sclera " clear  Lungs-normal expansion, no wheezing  Heart-regular rate and rhythm  Extremities-positive pulses, no edema    Neurologic Exam  WDWNWF  A/A/C, speech clear, attention normal, conversant, answers questions appropriately, good historian.  Cranial nerves II through XII are intact.  Funduscopic examination does not reveal AV nicking.  There is mild injection in the right sclera  Sensation is intact.  Gait is normal, balance is normal.   No tremors are noted.    Radiographic Imaging:  No imaging for review    Medical Decision Making  Diagnoses and all orders for this visit:    1. Acute intractable headache, unspecified headache type (Primary)  -     CT Angiogram Head; Future    2. Blurry vision, bilateral    3. Moderate nausea    4. Nuchal rigidity       This 53-year-old female presented with ongoing headache and nausea after a sudden severe headache 2 days ago.  She still has associated blurry vision nausea and neck pain.  Based upon her symptoms she requires a CTA to evaluate for aneurysm.    Any copied data from previous notes included in the (1) HPI, (2) PE, (3) MDM and/or assessment and plan has been reviewed and is accurate as of this day.    Jodee Glover, PAC    Patient Care Team:  Rika Monique APRN as PCP - General (Family Medicine)  Sterling Yee MD as Consulting Physician (General Surgery)

## 2024-05-07 ENCOUNTER — OFFICE VISIT (OUTPATIENT)
Dept: OBSTETRICS AND GYNECOLOGY | Facility: CLINIC | Age: 54
End: 2024-05-07
Payer: COMMERCIAL

## 2024-05-07 VITALS
HEIGHT: 70 IN | BODY MASS INDEX: 27.17 KG/M2 | WEIGHT: 189.8 LBS | SYSTOLIC BLOOD PRESSURE: 132 MMHG | DIASTOLIC BLOOD PRESSURE: 74 MMHG

## 2024-05-07 DIAGNOSIS — N89.8 VAGINAL DRYNESS: ICD-10-CM

## 2024-05-07 DIAGNOSIS — C50.411 MALIGNANT NEOPLASM OF UPPER-OUTER QUADRANT OF RIGHT BREAST IN FEMALE, ESTROGEN RECEPTOR NEGATIVE: ICD-10-CM

## 2024-05-07 DIAGNOSIS — Z12.11 SCREENING FOR COLON CANCER: ICD-10-CM

## 2024-05-07 DIAGNOSIS — Z97.5 IUD (INTRAUTERINE DEVICE) IN PLACE: ICD-10-CM

## 2024-05-07 DIAGNOSIS — Z79.890 HORMONE REPLACEMENT THERAPY (HRT): ICD-10-CM

## 2024-05-07 DIAGNOSIS — Z17.1 MALIGNANT NEOPLASM OF UPPER-OUTER QUADRANT OF RIGHT BREAST IN FEMALE, ESTROGEN RECEPTOR NEGATIVE: ICD-10-CM

## 2024-05-07 DIAGNOSIS — Z01.419 WOMEN'S ANNUAL ROUTINE GYNECOLOGICAL EXAMINATION: Primary | ICD-10-CM

## 2024-05-07 PROBLEM — N95.1 HOT FLUSHES, PERIMENOPAUSAL: Status: ACTIVE | Noted: 2024-05-07

## 2024-05-07 PROCEDURE — 99396 PREV VISIT EST AGE 40-64: CPT | Performed by: NURSE PRACTITIONER

## 2024-05-07 RX ORDER — ESTRADIOL 0.1 MG/G
CREAM VAGINAL
Qty: 1 EACH | Refills: 2 | Status: SHIPPED | OUTPATIENT
Start: 2024-05-07

## 2024-05-07 RX ORDER — ESTRADIOL 0.05 MG/D
1 PATCH, EXTENDED RELEASE TRANSDERMAL 2 TIMES WEEKLY
Qty: 24 PATCH | Refills: 4 | Status: SHIPPED | OUTPATIENT
Start: 2024-05-09

## 2024-05-21 ENCOUNTER — HOSPITAL ENCOUNTER (OUTPATIENT)
Dept: CT IMAGING | Facility: HOSPITAL | Age: 54
Discharge: HOME | End: 2024-05-21
Admitting: NURSE PRACTITIONER
Payer: COMMERCIAL

## 2024-05-21 DIAGNOSIS — Z87.891 PERSONAL HISTORY OF TOBACCO USE, PRESENTING HAZARDS TO HEALTH: ICD-10-CM

## 2024-05-21 PROCEDURE — 71271 CT THORAX LUNG CANCER SCR C-: CPT

## 2024-09-30 RX ORDER — SODIUM PICOSULFATE, MAGNESIUM OXIDE, AND ANHYDROUS CITRIC ACID 12; 3.5; 1 G/175ML; G/175ML; MG/175ML
350 LIQUID ORAL TAKE AS DIRECTED
Qty: 350 ML | Refills: 0 | Status: SHIPPED | OUTPATIENT
Start: 2024-09-30 | End: 2024-10-01

## 2024-10-14 ENCOUNTER — OUTSIDE FACILITY SERVICE (OUTPATIENT)
Dept: GASTROENTEROLOGY | Facility: CLINIC | Age: 54
End: 2024-10-14
Payer: COMMERCIAL

## 2024-10-14 PROCEDURE — 88305 TISSUE EXAM BY PATHOLOGIST: CPT | Performed by: INTERNAL MEDICINE

## 2024-10-14 PROCEDURE — 45385 COLONOSCOPY W/LESION REMOVAL: CPT | Performed by: INTERNAL MEDICINE

## 2024-10-14 PROCEDURE — 45380 COLONOSCOPY AND BIOPSY: CPT | Performed by: INTERNAL MEDICINE

## 2024-10-15 ENCOUNTER — LAB REQUISITION (OUTPATIENT)
Dept: LAB | Facility: HOSPITAL | Age: 54
End: 2024-10-15
Payer: COMMERCIAL

## 2024-10-15 DIAGNOSIS — K64.8 OTHER HEMORRHOIDS: ICD-10-CM

## 2024-10-15 DIAGNOSIS — Z86.0100 PERSONAL HISTORY OF COLON POLYPS, UNSPECIFIED: ICD-10-CM

## 2024-10-15 DIAGNOSIS — D12.8 BENIGN NEOPLASM OF RECTUM: ICD-10-CM

## 2024-10-15 DIAGNOSIS — Z12.11 ENCOUNTER FOR SCREENING FOR MALIGNANT NEOPLASM OF COLON: ICD-10-CM

## 2024-11-01 ENCOUNTER — LAB (OUTPATIENT)
Dept: LAB | Facility: HOSPITAL | Age: 54
End: 2024-11-01
Payer: COMMERCIAL

## 2024-11-01 ENCOUNTER — TRANSCRIBE ORDERS (OUTPATIENT)
Dept: LAB | Facility: HOSPITAL | Age: 54
End: 2024-11-01
Payer: COMMERCIAL

## 2024-11-01 DIAGNOSIS — Z01.810 PRE-OPERATIVE CARDIOVASCULAR EXAMINATION: Primary | ICD-10-CM

## 2024-11-01 LAB
ALBUMIN SERPL-MCNC: 4.9 G/DL (ref 3.5–5.2)
ALBUMIN/GLOB SERPL: 2 G/DL
ALP SERPL-CCNC: 67 U/L (ref 39–117)
ALT SERPL W P-5'-P-CCNC: 45 U/L (ref 1–33)
ANION GAP SERPL CALCULATED.3IONS-SCNC: 10 MMOL/L (ref 5–15)
AST SERPL-CCNC: 27 U/L (ref 1–32)
BILIRUB SERPL-MCNC: 0.4 MG/DL (ref 0–1.2)
BUN SERPL-MCNC: 21 MG/DL (ref 6–20)
BUN/CREAT SERPL: 32.8 (ref 7–25)
CALCIUM SPEC-SCNC: 9.8 MG/DL (ref 8.6–10.5)
CHLORIDE SERPL-SCNC: 98 MMOL/L (ref 98–107)
CO2 SERPL-SCNC: 27 MMOL/L (ref 22–29)
CREAT SERPL-MCNC: 0.64 MG/DL (ref 0.57–1)
DEPRECATED RDW RBC AUTO: 42.8 FL (ref 37–54)
EGFRCR SERPLBLD CKD-EPI 2021: 106.5 ML/MIN/1.73
ERYTHROCYTE [DISTWIDTH] IN BLOOD BY AUTOMATED COUNT: 13 % (ref 12.3–15.4)
GLOBULIN UR ELPH-MCNC: 2.4 GM/DL
GLUCOSE SERPL-MCNC: 94 MG/DL (ref 65–99)
HCT VFR BLD AUTO: 38.9 % (ref 34–46.6)
HGB BLD-MCNC: 13.5 G/DL (ref 12–15.9)
MCH RBC QN AUTO: 31.3 PG (ref 26.6–33)
MCHC RBC AUTO-ENTMCNC: 34.7 G/DL (ref 31.5–35.7)
MCV RBC AUTO: 90 FL (ref 79–97)
PLATELET # BLD AUTO: 218 10*3/MM3 (ref 140–450)
PMV BLD AUTO: 10 FL (ref 6–12)
POTASSIUM SERPL-SCNC: 4.1 MMOL/L (ref 3.5–5.2)
PROT SERPL-MCNC: 7.3 G/DL (ref 6–8.5)
RBC # BLD AUTO: 4.32 10*6/MM3 (ref 3.77–5.28)
SODIUM SERPL-SCNC: 135 MMOL/L (ref 136–145)
WBC NRBC COR # BLD AUTO: 5.38 10*3/MM3 (ref 3.4–10.8)

## 2024-11-01 PROCEDURE — 80053 COMPREHEN METABOLIC PANEL: CPT | Performed by: PLASTIC SURGERY

## 2024-11-01 PROCEDURE — 36415 COLL VENOUS BLD VENIPUNCTURE: CPT | Performed by: PLASTIC SURGERY

## 2024-11-01 PROCEDURE — 85027 COMPLETE CBC AUTOMATED: CPT | Performed by: PLASTIC SURGERY

## 2025-06-30 ENCOUNTER — TRANSCRIBE ORDERS (OUTPATIENT)
Dept: ADMINISTRATIVE | Facility: HOSPITAL | Age: 55
End: 2025-06-30
Payer: COMMERCIAL

## 2025-06-30 DIAGNOSIS — Z87.891 FORMER SMOKER: Primary | ICD-10-CM

## 2025-07-28 ENCOUNTER — HOSPITAL ENCOUNTER (OUTPATIENT)
Dept: CT IMAGING | Facility: HOSPITAL | Age: 55
Discharge: HOME | End: 2025-07-28
Admitting: NURSE PRACTITIONER
Payer: COMMERCIAL

## 2025-07-28 DIAGNOSIS — Z87.891 FORMER SMOKER: ICD-10-CM

## 2025-07-28 PROCEDURE — 71271 CT THORAX LUNG CANCER SCR C-: CPT

## 2025-07-29 ENCOUNTER — OFFICE VISIT (OUTPATIENT)
Dept: OBSTETRICS AND GYNECOLOGY | Facility: CLINIC | Age: 55
End: 2025-07-29
Payer: COMMERCIAL

## 2025-07-29 VITALS
WEIGHT: 177 LBS | SYSTOLIC BLOOD PRESSURE: 118 MMHG | DIASTOLIC BLOOD PRESSURE: 72 MMHG | BODY MASS INDEX: 25.34 KG/M2 | HEIGHT: 70 IN

## 2025-07-29 DIAGNOSIS — Z01.419 WOMEN'S ANNUAL ROUTINE GYNECOLOGICAL EXAMINATION: ICD-10-CM

## 2025-07-29 DIAGNOSIS — Z79.890 HORMONE REPLACEMENT THERAPY (HRT): ICD-10-CM

## 2025-07-29 DIAGNOSIS — Z17.1 MALIGNANT NEOPLASM OF UPPER-OUTER QUADRANT OF RIGHT BREAST IN FEMALE, ESTROGEN RECEPTOR NEGATIVE: Primary | ICD-10-CM

## 2025-07-29 DIAGNOSIS — N89.8 VAGINAL DRYNESS: ICD-10-CM

## 2025-07-29 DIAGNOSIS — C50.411 MALIGNANT NEOPLASM OF UPPER-OUTER QUADRANT OF RIGHT BREAST IN FEMALE, ESTROGEN RECEPTOR NEGATIVE: Primary | ICD-10-CM

## 2025-07-29 DIAGNOSIS — N95.1 MENOPAUSAL SYMPTOMS: ICD-10-CM

## 2025-07-29 DIAGNOSIS — Z97.5 IUD (INTRAUTERINE DEVICE) IN PLACE: ICD-10-CM

## 2025-07-29 RX ORDER — VENLAFAXINE HYDROCHLORIDE 75 MG/1
150 CAPSULE, EXTENDED RELEASE ORAL
COMMUNITY

## 2025-07-29 RX ORDER — VALACYCLOVIR HYDROCHLORIDE 500 MG/1
500 TABLET, FILM COATED ORAL DAILY
COMMUNITY
Start: 2025-03-06

## 2025-07-29 RX ORDER — ESTRADIOL 0.1 MG/G
CREAM VAGINAL
Qty: 1 EACH | Refills: 2 | Status: SHIPPED | OUTPATIENT
Start: 2025-07-29

## 2025-07-29 RX ORDER — ESTRADIOL 0.05 MG/D
1 PATCH, EXTENDED RELEASE TRANSDERMAL 2 TIMES WEEKLY
Qty: 24 PATCH | Refills: 4 | Status: SHIPPED | OUTPATIENT
Start: 2025-07-31

## 2025-07-29 NOTE — PROGRESS NOTES
Gynecologic Annual Exam Note          GYN Annual Exam     Gynecologic Exam (Annual )        Subjective     HPI  Lita Pisano is a 54 y.o. female, , who presents for annual well woman exam as a established patient. There were no changes to her medical or surgical history since her last visit..  No LMP recorded. Patient has had an implant.  Her periods are absent secondary to birth control. The flow is absent. She denies dysmenorrhea. Marital Status: . She is sexually active. She has not had new partners.. STD testing recommendations have been explained to the patient and she declines STD testing.    The patient would like to discuss the following complaints today:she would like a refill on patches.  She is having episodes of dizziness and nausea.     Additional OB/GYN History   contraceptive methods: IUD.  Insertion date:   Desires to: continue contraception  History of migraines: yes with aura    Last Pap : 2023. Result: negative. HPV: negative.   Last Completed Pap Smear            Upcoming       PAP SMEAR (Every 3 Years) Next due on 5/3/2026      2023  LIQUID-BASED PAP SMEAR WITH HPV GENOTYPING REGARDLESS OF INTERPRETATION ([facility])    2022  SCANNED - PAP SMEAR    2021  SCANNED - PAP SMEAR    2018  Pap IG, HPV-hr    2015  SCANNED - PAP SMEAR     Only the first 5 history entries have been loaded, but more history exists.                        History of abnormal Pap smear: no  Family history of uterine, colon, breast, or ovarian cancer: no  Performs monthly Self-Breast Exam: no  Last mammogram: . Done at [facility]. There is a copy in the chart. She is post mastectomy from breast cancer dx     Last Completed Mammogram            Completed or No Longer Recommended       MAMMOGRAM  Discontinued        Frequency changed to Never automatically (Topic No Longer Applies)    2022  Mammo Diagnostic Digital Tomosynthesis Right With CAD     05/24/2022  Mammo Screening Digital Tomosynthesis Bilateral With CAD    10/20/2020  Mammo Screening Digital Tomosynthesis Bilateral With CAD    06/21/2019  Mammo Screening Digital Tomosynthesis Bilateral With CAD      Only the first 5 history entries have been loaded, but more history exists.                            Colonoscopy: has had a colonoscopy 1 yr ago  Exercises Regularly: no  Feelings of Anxiety or Depression: yes - managed with medications  Tobacco Usage?: No       Current Outpatient Medications:     aspirin 81 MG chewable tablet, Chew 1 tablet Daily., Disp: , Rfl:     atorvastatin (LIPITOR) 10 MG tablet, Take 1 tablet by mouth Daily., Disp: , Rfl:     cetirizine (zyrTEC) 10 MG tablet, Take 1 tablet by mouth Daily., Disp: , Rfl:     estradiol (ESTRACE VAGINAL) 0.1 MG/GM vaginal cream, 1/2 gram per vagina qhs x 7 then twice weekly, Disp: 1 each, Rfl: 2    estradiol (MINIVELLE, VIVELLE-DOT) 0.05 MG/24HR patch, Place 1 patch on the skin as directed by provider 2 (Two) Times a Week., Disp: 24 patch, Rfl: 4    hydroCHLOROthiazide (MICROZIDE) 12.5 MG capsule, Take 1 capsule by mouth Every Other Day., Disp: , Rfl:     levonorgestrel (MIRENA) 20 MCG/24HR IUD, 1 each by Intrauterine route 1 (One) Time., Disp: , Rfl:     LORazepam (ATIVAN) 0.5 MG tablet, , Disp: , Rfl:     montelukast (SINGULAIR) 10 MG tablet, Take 1 tablet by mouth Every Night., Disp: , Rfl:     Multiple Vitamins-Minerals (MULTIVITAMIN WITH MINERALS) tablet tablet, Take 1 tablet by mouth Daily., Disp: , Rfl:     nebivolol (BYSTOLIC) 5 MG tablet, Take 1 tablet by mouth Daily., Disp: , Rfl:     ondansetron ODT (ZOFRAN-ODT) 4 MG disintegrating tablet, DISSOLVE 1 TABLET IN MOUTH EVERY 6 HOURS AS NEEDED, Disp: , Rfl:     valACYclovir (VALTREX) 500 MG tablet, Take 1 tablet by mouth Daily., Disp: , Rfl:     venlafaxine XR (EFFEXOR-XR) 75 MG 24 hr capsule, 2 capsules., Disp: , Rfl:     vitamin E 400 Units capsule, Take 1 capsule by mouth Daily., Disp: ,  Rfl:     cloNIDine (CATAPRES) 0.1 MG tablet, Take 1 tablet by mouth., Disp: , Rfl:     venlafaxine XR (EFFEXOR-XR) 150 MG 24 hr capsule, Take 1 capsule by mouth Daily., Disp: , Rfl:      Patient is requesting refills of vivelle patch, estrace vaginal cream.    OB History          3    Para   3    Term   3            AB        Living   3         SAB        IAB        Ectopic        Molar        Multiple        Live Births                    Past Medical History:   Diagnosis Date    Abnormal Pap smear of cervix     I think 2-3 years ago    Anxiety     Breast cancer 2022    Right    Depression     Headache     Hyperemesis gravidarum     Hyperlipidemia     Hypertension     Migraine     Ovarian cyst     25 years ago    Urinary tract infection     HX        Past Surgical History:   Procedure Laterality Date    AUGMENTATION MAMMAPLASTY      saline, retro    BREAST AUGMENTATION Bilateral 2019    saline removed and replaced with Gel Dr Kaye    BREAST BIOPSY  2022    BREAST RECONSTRUCTION N/A 01/10/2023    Procedure: BREAST RECONSTRUCTION RIGHT, DELAYED WITH LEFT IMMEDIATE RECONSTRUCTION W/ TISSUE EXPANDER AND ALLODERM INSERTION;  Surgeon: Emiliano Ackerman MD;  Location:  [facility];  Service: Plastics;  Laterality: N/A;    BREAST RECONSTRUCTION, BREAST TISSUE EXPANDER REMOVAL, IMPLANT INSERTION Bilateral 2023    Procedure: BREAST TISSUE EXPANDERS EXCHANGE TO PERMANENT IMPLANTS BILATERAL;  Surgeon: Emiliano Ackerman MD;  Location:  [facility];  Service: Plastics;  Laterality: Bilateral;    CHOLECYSTECTOMY      COLONOSCOPY      DENTAL PROCEDURE      Dental implant    FAT GRAFTING Bilateral 2023    Procedure: BREAST FAT GRAFTING BILATERAL;  Surgeon: Emiliano Ackerman MD;  Location:  [facility];  Service: Plastics;  Laterality: Bilateral;    LAPAROSCOPIC CHOLECYSTECTOMY  2009    MASTECTOMY Right 01/10/2023    Procedure: EXCISION OF RIGHT BREAST IMPLANT capsule, LEFT  "PROPHYLACTIC MASTECTOMY;  Surgeon: Sterling Yee MD;  Location:  [facility];  Service: General;  Laterality: Right;    MASTECTOMY COMPLETE / SIMPLE Left 01/10/2023    prophylactic    MASTECTOMY W/ SENTINEL NODE BIOPSY Right 06/28/2022    Procedure: BREAST SKIN SPARING  MASTECTOMY WITH SENTINEL NODE BIOPSY- RIGHT;  Surgeon: Sterling Yee MD;  Location:  [facility];  Service: General;  Laterality: Right;    SKIN BIOPSY         Health Maintenance   Topic Date Due    TDAP/TD VACCINES (1 - Tdap) Never done    HEPATITIS C SCREENING  Never done    ANNUAL PHYSICAL  Never done    ZOSTER VACCINE (1 of 2) Never done    Pneumococcal Vaccine 50+ (2 of 2 - PCV) 03/08/2023    COVID-19 Vaccine (3 - 2024-25 season) 09/01/2024    Annual Gynecologic Pelvic and Breast Exam  05/08/2025    INFLUENZA VACCINE  10/01/2025    PAP SMEAR  05/03/2026    COLORECTAL CANCER SCREENING  10/14/2034    MAMMOGRAM  Discontinued       The additional following portions of the patient's history were reviewed and updated as appropriate: allergies, current medications, past family history, past medical history, past social history, past surgical history, and problem list.    Review of Systems   Constitutional: Negative.    HENT: Negative.     Eyes: Negative.    Respiratory: Negative.     Cardiovascular: Negative.    Gastrointestinal: Negative.    Endocrine: Negative.    Genitourinary: Negative.    Musculoskeletal: Negative.    Skin: Negative.    Allergic/Immunologic: Negative.    Neurological:  Positive for dizziness.   Hematological: Negative.    Psychiatric/Behavioral: Negative.           I have reviewed and agree with the HPI, ROS, and historical information as entered above. .me        Objective   /72   Ht 177.8 cm (70\")   Wt 80.3 kg (177 lb)   BMI 25.40 kg/m²     Physical Exam  Vitals and nursing note reviewed. Exam conducted with a chaperone present.   Constitutional:       General: She is not in acute distress.     Appearance: " Normal appearance. She is well-developed. She is not ill-appearing.   Neck:      Thyroid: No thyroid mass or thyromegaly.   Pulmonary:      Effort: Pulmonary effort is normal. No respiratory distress or retractions.   Chest:      Chest wall: No mass.   Breasts:     Right: Normal. No mass, nipple discharge, skin change or tenderness.      Left: Normal. No mass, nipple discharge, skin change or tenderness.   Abdominal:      General: There is no distension.      Palpations: Abdomen is soft. Abdomen is not rigid. There is no mass.      Tenderness: There is no abdominal tenderness. There is no guarding or rebound.      Hernia: No hernia is present. There is no hernia in the left inguinal area.   Genitourinary:     General: Normal vulva.      Labia:         Right: No rash, tenderness or lesion.         Left: No rash, tenderness or lesion.       Vagina: Normal. No vaginal discharge or lesions.      Cervix: Normal.      Uterus: Normal. Not enlarged, not fixed and not tender.       Adnexa: Right adnexa normal and left adnexa normal.        Right: No mass or tenderness.          Left: No mass or tenderness.        Rectum: No external hemorrhoid.   Musculoskeletal:      Cervical back: No muscular tenderness.   Skin:     General: Skin is warm and dry.   Neurological:      Mental Status: She is alert and oriented to person, place, and time.   Psychiatric:         Mood and Affect: Mood normal.         Behavior: Behavior normal.            Assessment and Plan    Problem List Items Addressed This Visit          Endocrine and Metabolic    Hormone replacement therapy (HRT)    Relevant Medications    levonorgestrel (MIRENA) 20 MCG/24HR IUD    atorvastatin (LIPITOR) 10 MG tablet    estradiol (MINIVELLE, VIVELLE-DOT) 0.05 MG/24HR patch       Genitourinary and Reproductive     IUD (intrauterine device) in place    Relevant Medications    levonorgestrel (MIRENA) 20 MCG/24HR IUD    hydroCHLOROthiazide (MICROZIDE) 12.5 MG capsule     nebivolol (BYSTOLIC) 5 MG tablet    estradiol (MINIVELLE, VIVELLE-DOT) 0.05 MG/24HR patch    estradiol (ESTRACE VAGINAL) 0.1 MG/GM vaginal cream    Vaginal dryness    Relevant Medications    levonorgestrel (MIRENA) 20 MCG/24HR IUD    hydroCHLOROthiazide (MICROZIDE) 12.5 MG capsule    nebivolol (BYSTOLIC) 5 MG tablet    estradiol (MINIVELLE, VIVELLE-DOT) 0.05 MG/24HR patch    estradiol (ESTRACE VAGINAL) 0.1 MG/GM vaginal cream       Hematology and Neoplasia    Malignant neoplasm of upper-outer quadrant of right female breast - Primary    Relevant Medications    levonorgestrel (MIRENA) 20 MCG/24HR IUD    ondansetron ODT (ZOFRAN-ODT) 4 MG disintegrating tablet    aspirin 81 MG chewable tablet    estradiol (MINIVELLE, VIVELLE-DOT) 0.05 MG/24HR patch    estradiol (ESTRACE VAGINAL) 0.1 MG/GM vaginal cream     Other Visit Diagnoses         Women's annual routine gynecological examination          Menopausal symptoms                GYN annual well woman exam.   Pap guidelines reviewed.  Reviewed monthly self breast exams.  Instructed to call with lumps, pain, or breast discharge.    Ordered Mammogram today  Recommended use of Vitamin D replacement and getting adequate calcium in her diet. (1500mg)  Reviewed exercise as a preventative health measures.   Reccommended Flu Vaccine in Fall of each year.  Symptoms of menopausal transition reviewed with patient.   RTC in 1 year or PRN with problems.  Discussed importance of routine colon cancer screening. Reviewed current guidelines. Recommended colonoscopy after age 45.  Return in about 1 year (around 7/29/2026) for Annual physical.     Geri Martini, APRN  07/29/2025

## (undated) DEVICE — PK CHST BRST 10

## (undated) DEVICE — TBG PENCL TELESCP MEGADYNE SMOKE EVAC 10FT

## (undated) DEVICE — JACKSON-PRATT 100CC BULB RESERVOIR: Brand: CARDINAL HEALTH

## (undated) DEVICE — ELECTRD BLD EZ CLN MOD 4IN

## (undated) DEVICE — PATIENT RETURN ELECTRODE, SINGLE-USE, CONTACT QUALITY MONITORING, ADULT, WITH 9FT CORD, FOR PATIENTS WEIGING OVER 33LBS. (15KG): Brand: MEGADYNE

## (undated) DEVICE — SUT SILK 3/0 TIES 18IN A184H

## (undated) DEVICE — SUT MNCRYL PLS ANTIB UD 4/0 PS2 18IN

## (undated) DEVICE — BNDR ABD PREMIUM/UNIV 10IN 27TO48IN

## (undated) DEVICE — SYS FAT GRAFTING REVOLVE SGL

## (undated) DEVICE — DRSNG WND GZ PAD BORDERED 4X8IN STRL

## (undated) DEVICE — CVR PROB TRANSD 3D CIV FLEX FOLD 5.5X36IN

## (undated) DEVICE — TRAP FLD MINIVAC MEGADYNE 100ML

## (undated) DEVICE — SUT ETHLN 3/0 FS1 30IN 669H

## (undated) DEVICE — APPL CHLORAPREP W/TINT 26ML BLU

## (undated) DEVICE — SYR CATH/TIP 50ML 2OZ STRL 1P/U

## (undated) DEVICE — ELECTRD BLD EZ CLN STD 2.5IN

## (undated) DEVICE — GLV SURG SENSICARE PI ORTHO SZ7.5 LF STRL

## (undated) DEVICE — DISH PETRI 3.5IN MD STRL LF

## (undated) DEVICE — HDRST POSTIN FM CRDL TRACH SLOT 9X8X4IN

## (undated) DEVICE — BLANKT WARM UNDER/BDY FUL/ACC A/ 90X206CM

## (undated) DEVICE — BANDAGE,GAUZE,BULKEE II,4.5"X4.1YD,STRL: Brand: MEDLINE

## (undated) DEVICE — SUT MNCRYL PLS ANTIB UD 3/0 PS2 27IN

## (undated) DEVICE — SYR LUERLOK 50ML

## (undated) DEVICE — ELECTRD BLD EZ CLN MOD XLNG 2.75IN

## (undated) DEVICE — GOWN,NON-REINFORCED,SIRUS,SET IN SLV,XL: Brand: MEDLINE

## (undated) DEVICE — SYR LUERLOK 20CC BX/50

## (undated) DEVICE — APPL CHLORAPREP TINTED 26ML TEAL

## (undated) DEVICE — INTENDED FOR TISSUE SEPARATION, AND OTHER PROCEDURES THAT REQUIRE A SHARP SURGICAL BLADE TO PUNCTURE OR CUT.: Brand: BARD-PARKER ® STAINLESS STEEL BLADES

## (undated) DEVICE — BNDG ELAS ELITE V/CLOSE 6IN 5YD LF STRL

## (undated) DEVICE — LINER CANSTR SXN HERCULES

## (undated) DEVICE — SUT ETHLN 4/0 PC3 18IN 1864G

## (undated) DEVICE — DRAIN JACKSON PRATT ROUND 15FR: Brand: CARDINAL HEALTH

## (undated) DEVICE — NDL HYPO ECLPS SFTY 18G 1 1/2IN

## (undated) DEVICE — ANTIBACTERIAL UNDYED BRAIDED (POLYGLACTIN 910), SYNTHETIC ABSORBABLE SUTURE: Brand: COATED VICRYL

## (undated) DEVICE — DRSNG PAD ABD 8X10IN STRL

## (undated) DEVICE — TOWEL,OR,DSP,ST,BLUE,STD,4/PK,20PK/CS: Brand: MEDLINE

## (undated) DEVICE — Device

## (undated) DEVICE — PROXIMATE RH ROTATING HEAD SKIN STAPLERS (35 WIDE) CONTAINS 35 STAINLESS STEEL STAPLES: Brand: PROXIMATE

## (undated) DEVICE — GLV SURG BIOGEL LTX PF 7 1/2

## (undated) DEVICE — SUT SILK 2/0 SH 30IN K833H

## (undated) DEVICE — DRSNG WND BORDR/ADHS NONADHR/GZ LF 2X2IN STRL

## (undated) DEVICE — SUT SILK 2/0 PS 18IN 1588H

## (undated) DEVICE — STPCK 4WY ON/OFF VLV M/COLAR FIT 45PSI STRL

## (undated) DEVICE — IRRIGATOR BULB ASEPTO 60CC STRL

## (undated) DEVICE — DECANTER BAG 9": Brand: MEDLINE INDUSTRIES, INC.

## (undated) DEVICE — LEX GENERAL BREAST: Brand: MEDLINE INDUSTRIES, INC.

## (undated) DEVICE — SUT GUT PLN FAST ABS 5/0 PC1 18IN 1915G

## (undated) DEVICE — PREVENA DUO PEEL & PLACE SYSTEM KIT- 13 CM: Brand: PREVENA DUO™ PEEL & PLACE™

## (undated) DEVICE — TP PLSTC CLR TRNSPORE 1IN SURG

## (undated) DEVICE — BLANKT WARM LOWR/BDY 100X120CM

## (undated) DEVICE — DRAPE,TOP,102X53,STERILE: Brand: MEDLINE

## (undated) DEVICE — TBG SXN LIPECTOMY 108IN